# Patient Record
Sex: FEMALE | Race: WHITE | Employment: FULL TIME | URBAN - METROPOLITAN AREA
[De-identification: names, ages, dates, MRNs, and addresses within clinical notes are randomized per-mention and may not be internally consistent; named-entity substitution may affect disease eponyms.]

---

## 2018-07-12 ENCOUNTER — HOSPITAL ENCOUNTER (OUTPATIENT)
Dept: WOMENS IMAGING | Age: 48
Discharge: HOME | End: 2018-07-12
Attending: OBSTETRICS & GYNECOLOGY | Admitting: OBSTETRICS & GYNECOLOGY

## 2018-07-12 DIAGNOSIS — Z12.31 VISIT FOR SCREENING MAMMOGRAM: ICD-10-CM

## 2018-07-17 ENCOUNTER — HOSPITAL ENCOUNTER (OUTPATIENT)
Dept: CT IMAGING | Age: 48
Discharge: HOME | End: 2018-07-17
Admitting: SPECIALIST

## 2018-07-17 DIAGNOSIS — C64.2 RENAL CELL CARCINOMA OF LEFT KIDNEY (HCC): ICD-10-CM

## 2018-07-17 DIAGNOSIS — C64.9 MALIGNANT NEOPLASM OF KIDNEY EXCLUDING RENAL PELVIS (HCC): ICD-10-CM

## 2020-04-01 ENCOUNTER — HOSPITAL ENCOUNTER (OUTPATIENT)
Dept: GENERAL RADIOLOGY | Age: 50
Discharge: HOME | End: 2020-04-01
Payer: COMMERCIAL

## 2020-04-01 ENCOUNTER — HOSPITAL ENCOUNTER (OUTPATIENT)
Age: 50
Discharge: HOME | End: 2020-04-01
Payer: COMMERCIAL

## 2020-04-01 PROCEDURE — 71046 X-RAY EXAM CHEST 2 VIEWS: CPT

## 2022-06-21 DIAGNOSIS — H91.90 HEARING LOSS, UNSPECIFIED HEARING LOSS TYPE, UNSPECIFIED LATERALITY: Primary | ICD-10-CM

## 2022-06-22 RX ORDER — LORATADINE 10 MG/1
10 CAPSULE, LIQUID FILLED ORAL DAILY
COMMUNITY
End: 2022-07-06

## 2022-06-22 NOTE — PROGRESS NOTES
[facility] EGD PRE-OPERATIVE INSTRUCTIONS    Procedure date__06/28/22_______  Arrival time___0830_________          Surgery time_0930___________       Nothing by mouth after midnight the night before the procedure. This includes water chewing gum, mints and ice chips. You may brush your teeth and gargle the morning of your surgery, but do not swallow the water    You may be asked to stop blood thinners such as Coumadin, Plavix, Fragmin, Lovenox, etc., or any anti-inflammatories such as:  Aspirin, Ibuprofen, Advil, Naproxen prior to your procedure. We also ask that you stop any OTC medications such as fish oil, vitamin E, glucosamine, garlic, Multivitamins, COQ 10, etc.    You must make arrangements for a responsible adult to arrive with you and stay in our waiting area during your procedure. They will also need to take you home after your procedure. For your safety you will not be allowed to leave alone or drive yourself home. Also for your safety, it is strongly suggested that someone stay with you the first 24 hours after your procedure. For your comfort, please wear simple loose fitting clothing to the center. Please do not bring valuables. If you have a living will and a durable power of  for healthcare, please bring in a copy.     You will need to bring a photo ID and insurance card    Our goal is to provide you with excellent care so if you have any questions, please contact us at the [facility] at [phone]         Please note these are generalized instructions for all EGD cases, you may be provided with more specific instructions if necessary

## 2022-06-27 ENCOUNTER — ANESTHESIA EVENT (OUTPATIENT)
Dept: ENDOSCOPY | Age: 52
End: 2022-06-27
Payer: COMMERCIAL

## 2022-06-28 ENCOUNTER — HOSPITAL ENCOUNTER (OUTPATIENT)
Age: 52
Setting detail: OUTPATIENT SURGERY
Discharge: HOME | End: 2022-06-28
Attending: INTERNAL MEDICINE | Admitting: INTERNAL MEDICINE
Payer: COMMERCIAL

## 2022-06-28 ENCOUNTER — ANESTHESIA (OUTPATIENT)
Dept: ENDOSCOPY | Age: 52
End: 2022-06-28
Payer: COMMERCIAL

## 2022-06-28 VITALS
HEIGHT: 66 IN | HEART RATE: 78 BPM | TEMPERATURE: 97 F | DIASTOLIC BLOOD PRESSURE: 67 MMHG | BODY MASS INDEX: 18.48 KG/M2 | WEIGHT: 115 LBS | OXYGEN SATURATION: 97 % | SYSTOLIC BLOOD PRESSURE: 94 MMHG | RESPIRATION RATE: 18 BRPM

## 2022-06-28 PROCEDURE — 2580000003 HC RX 258: Performed by: NURSE ANESTHETIST, CERTIFIED REGISTERED

## 2022-06-28 PROCEDURE — 6360000002 HC RX W HCPCS: Performed by: NURSE ANESTHETIST, CERTIFIED REGISTERED

## 2022-06-28 PROCEDURE — 7100000011 HC PHASE II RECOVERY - ADDTL 15 MIN: Performed by: INTERNAL MEDICINE

## 2022-06-28 PROCEDURE — 3700000001 HC ADD 15 MINUTES (ANESTHESIA): Performed by: INTERNAL MEDICINE

## 2022-06-28 PROCEDURE — 2580000003 HC RX 258: Performed by: ANESTHESIOLOGY

## 2022-06-28 PROCEDURE — 3609017100 HC EGD: Performed by: INTERNAL MEDICINE

## 2022-06-28 PROCEDURE — 3700000000 HC ANESTHESIA ATTENDED CARE: Performed by: INTERNAL MEDICINE

## 2022-06-28 PROCEDURE — 2500000003 HC RX 250 WO HCPCS: Performed by: NURSE ANESTHETIST, CERTIFIED REGISTERED

## 2022-06-28 PROCEDURE — 7100000010 HC PHASE II RECOVERY - FIRST 15 MIN: Performed by: INTERNAL MEDICINE

## 2022-06-28 RX ORDER — SODIUM CHLORIDE 0.9 % (FLUSH) 0.9 %
5-40 SYRINGE (ML) INJECTION EVERY 12 HOURS SCHEDULED
Status: DISCONTINUED | OUTPATIENT
Start: 2022-06-28 | End: 2022-06-28 | Stop reason: HOSPADM

## 2022-06-28 RX ORDER — SODIUM CHLORIDE 0.9 % (FLUSH) 0.9 %
5-40 SYRINGE (ML) INJECTION PRN
Status: DISCONTINUED | OUTPATIENT
Start: 2022-06-28 | End: 2022-06-28 | Stop reason: HOSPADM

## 2022-06-28 RX ORDER — PROPOFOL 10 MG/ML
INJECTION, EMULSION INTRAVENOUS PRN
Status: DISCONTINUED | OUTPATIENT
Start: 2022-06-28 | End: 2022-06-28 | Stop reason: SDUPTHER

## 2022-06-28 RX ORDER — SODIUM CHLORIDE 9 MG/ML
INJECTION, SOLUTION INTRAVENOUS CONTINUOUS
Status: DISCONTINUED | OUTPATIENT
Start: 2022-06-28 | End: 2022-06-28 | Stop reason: HOSPADM

## 2022-06-28 RX ORDER — ONDANSETRON 2 MG/ML
INJECTION INTRAMUSCULAR; INTRAVENOUS
Status: DISCONTINUED
Start: 2022-06-28 | End: 2022-06-28 | Stop reason: HOSPADM

## 2022-06-28 RX ORDER — SODIUM CHLORIDE 9 MG/ML
INJECTION, SOLUTION INTRAVENOUS CONTINUOUS PRN
Status: DISCONTINUED | OUTPATIENT
Start: 2022-06-28 | End: 2022-06-28 | Stop reason: SDUPTHER

## 2022-06-28 RX ORDER — SODIUM CHLORIDE 9 MG/ML
INJECTION, SOLUTION INTRAVENOUS PRN
Status: DISCONTINUED | OUTPATIENT
Start: 2022-06-28 | End: 2022-06-28 | Stop reason: HOSPADM

## 2022-06-28 RX ORDER — LIDOCAINE HYDROCHLORIDE 20 MG/ML
INJECTION, SOLUTION EPIDURAL; INFILTRATION; INTRACAUDAL; PERINEURAL PRN
Status: DISCONTINUED | OUTPATIENT
Start: 2022-06-28 | End: 2022-06-28 | Stop reason: SDUPTHER

## 2022-06-28 RX ORDER — PROPOFOL 10 MG/ML
INJECTION, EMULSION INTRAVENOUS CONTINUOUS PRN
Status: DISCONTINUED | OUTPATIENT
Start: 2022-06-28 | End: 2022-06-28 | Stop reason: SDUPTHER

## 2022-06-28 RX ADMIN — SODIUM CHLORIDE: 9 INJECTION, SOLUTION INTRAVENOUS at 08:47

## 2022-06-28 RX ADMIN — LIDOCAINE HYDROCHLORIDE 60 MG: 20 INJECTION, SOLUTION EPIDURAL; INFILTRATION; INTRACAUDAL; PERINEURAL at 09:48

## 2022-06-28 RX ADMIN — PROPOFOL 70 MG: 10 INJECTION, EMULSION INTRAVENOUS at 09:48

## 2022-06-28 RX ADMIN — SODIUM CHLORIDE: 9 INJECTION, SOLUTION INTRAVENOUS at 09:02

## 2022-06-28 RX ADMIN — PROPOFOL 200 MCG/KG/MIN: 10 INJECTION, EMULSION INTRAVENOUS at 09:48

## 2022-06-28 ASSESSMENT — PAIN DESCRIPTION - DESCRIPTORS: DESCRIPTORS: ACHING

## 2022-06-28 ASSESSMENT — ENCOUNTER SYMPTOMS: SHORTNESS OF BREATH: 0

## 2022-06-28 ASSESSMENT — PAIN - FUNCTIONAL ASSESSMENT
PAIN_FUNCTIONAL_ASSESSMENT: 0-10
PAIN_FUNCTIONAL_ASSESSMENT: PREVENTS OR INTERFERES SOME ACTIVE ACTIVITIES AND ADLS
PAIN_FUNCTIONAL_ASSESSMENT: NONE - DENIES PAIN

## 2022-06-28 ASSESSMENT — PAIN SCALES - GENERAL
PAINLEVEL_OUTOF10: 0

## 2022-06-28 NOTE — ANESTHESIA POSTPROCEDURE EVALUATION
Department of Anesthesiology  Postprocedure Note    Patient: Chivo Thompson  MRN: [id number]  YOB: 1970  Date of evaluation: 6/28/2022      Procedure Summary     Date: 06/28/22 Room / Location: [address] [place] [address]    Anesthesia Start: 0940 Anesthesia Stop: 1355    Procedure: EGD ESOPHAGOGASTRODUODENOSCOPY (N/A ) Diagnosis:       Nausea and vomiting, intractability of vomiting not specified, unspecified vomiting type      Epigastric pain      (Nausea and vomiting, Epigastric pain)    Surgeons: Ryne De Jesus MD Responsible Provider: Jeniffer Stephens MD    Anesthesia Type: MAC ASA Status: 2          Anesthesia Type: No value filed.     Elba Phase I: Elba Score: 10    Elba Phase II: Elba Score: 10      Anesthesia Post Evaluation    Patient location during evaluation: PACU  Patient participation: complete - patient participated  Level of consciousness: awake  Airway patency: patent  Nausea & Vomiting: no nausea and no vomiting  Cardiovascular status: blood pressure returned to baseline  Respiratory status: acceptable  Hydration status: stable  Multimodal analgesia pain management approach

## 2022-06-28 NOTE — OP NOTE
Operative Note      Patient: Mickie Miller  YOB: 1970  MRN: [id number]    Date of Procedure: 6/28/2022    Pre-Op Diagnosis: Nausea and vomiting, Epigastric pain    Post-Op Diagnosis: Same       Procedure(s):  EGD ESOPHAGOGASTRODUODENOSCOPY    Surgeon(s):  Margaret Awan MD    Assistant:   * No surgical staff found *    Anesthesia: Monitor Anesthesia Care    Estimated Blood Loss (mL): None    Complications: None    Specimens:   * No specimens in log *    Implants:  * No implants in log *      Drains: * No LDAs found *    Findings: See dictated report    Detailed Description of Procedure:   See dictated report    Electronically signed by Maragret Awan MD on 6/28/2022 at 9:59 AM

## 2022-06-28 NOTE — ANESTHESIA PRE PROCEDURE
Department of Anesthesiology  Preprocedure Note       Name:  Tati Belle   Age:  50 y.o.  :  1970                                          MRN:  [id number]         Date:  2022      Surgeon: Brayan Berger):  Nadia Do MD    Procedure: Procedure(s):  EGD ESOPHAGOGASTRODUODENOSCOPY    Medications prior to admission:   Prior to Admission medications    Medication Sig Start Date End Date Taking? Authorizing Provider   loratadine (CLARITIN) 10 MG capsule Take 10 mg by mouth daily   Yes Historical Provider, MD   omeprazole (PRILOSEC) 20 MG delayed release capsule Take 40 mg by mouth daily    Historical Provider, MD   diazepam (VALIUM) 5 MG tablet Take 5 mg by mouth every 12 hours as needed for Anxiety    Historical Provider, MD       Current medications:    Current Facility-Administered Medications   Medication Dose Route Frequency Provider Last Rate Last Admin    0.9 % sodium chloride infusion   IntraVENous Continuous Casie Augustin MD 75 mL/hr at 22 0902 New Bag at 22 0902    sodium chloride flush 0.9 % injection 5-40 mL  5-40 mL IntraVENous 2 times per day aCsie Augustin MD        sodium chloride flush 0.9 % injection 5-40 mL  5-40 mL IntraVENous PRN Casie Augustin MD        0.9 % sodium chloride infusion   IntraVENous PRN Casie Augustin MD        ondansetron [facility]) 4 MG/2ML injection              Facility-Administered Medications Ordered in Other Encounters   Medication Dose Route Frequency Provider Last Rate Last Admin    0.9 % sodium chloride infusion   IntraVENous Continuous PRN DAVE Jeff - CRNA   New Bag at 22 6790       Allergies: Allergies   Allergen Reactions    Sulfa Antibiotics Rash       Problem List:  There is no problem list on file for this patient.       Past Medical History:        Diagnosis Date    Anxiety     GERD (gastroesophageal reflux disease)     Kidney stones        Past Surgical History:        Procedure Laterality Date     SECTION      ENDOSCOPY, COLON, DIAGNOSTIC      TUBAL LIGATION         Social History:    Social History     Tobacco Use    Smoking status: Current Every Day Smoker     Packs/day: 1.00     Types: Cigarettes    Smokeless tobacco: Never Used   Substance Use Topics    Alcohol use: Yes     Comment: rarely                                Ready to quit: Not Answered  Counseling given: Not Answered      Vital Signs (Current):   Vitals:    22 1210 22 0850 22 0858   BP:   95/61   Pulse:   88   Resp:   17   Temp:   97.6 °F (36.4 °C)   TempSrc:   Temporal   SpO2:   99%   Weight: 115 lb (52.2 kg) 115 lb (52.2 kg)    Height: 5' 6\" (1.676 m) 5' 6\" (1.676 m)                                               BP Readings from Last 3 Encounters:   22 95/61   18 123/86   17 120/81       NPO Status: Time of last liquid consumption:                         Time of last solid consumption: 0                        Date of last liquid consumption: 22                        Date of last solid food consumption: 22    BMI:   Wt Readings from Last 3 Encounters:   22 115 lb (52.2 kg)   18 127 lb 10.3 oz (57.9 kg)   17 114 lb 10.2 oz (52 kg)     Body mass index is 18.56 kg/m².     CBC:   Lab Results   Component Value Date    WBC 7.5 2018    RBC 3.43 2018    HGB 11.5 2018    HCT 32.3 2018    MCV 94.1 2018    RDW 13.0 2018     2018       CMP:   Lab Results   Component Value Date     2018    K 3.1 2018     2018    CO2 23 2018    BUN 6 2018    CREATININE <0.5 2018    GFRAA >60 2018    GFRAA >60 10/20/2010    AGRATIO 1.3 2018    LABGLOM >60 2018    GLUCOSE 87 2018    PROT 6.9 2018    PROT 8.1 10/20/2010    CALCIUM 8.5 2018    BILITOT 0.5 2018    ALKPHOS 59 2018    AST 13 2018    ALT 7 2018 POC Tests: No results for input(s): POCGLU, POCNA, POCK, POCCL, POCBUN, POCHEMO, POCHCT in the last 72 hours. Coags:   Lab Results   Component Value Date    PROTIME 11.7 10/20/2010    INR 1.06 10/20/2010    APTT 30.2 10/20/2010       HCG (If Applicable):   Lab Results   Component Value Date    PREGTESTUR Negative 06/18/2018        ABGs: No results found for: PHART, PO2ART, FSW9SGZ, CAF2GVH, BEART, O9AYZEVM     Type & Screen (If Applicable):  No results found for: LABABO, LABRH    Drug/Infectious Status (If Applicable):  No results found for: HIV, HEPCAB    COVID-19 Screening (If Applicable): No results found for: COVID19        Anesthesia Evaluation  Patient summary reviewed and Nursing notes reviewed no history of anesthetic complications:   Airway: Mallampati: II  TM distance: >3 FB   Neck ROM: full  Mouth opening: > = 3 FB   Dental:    (+) edentulous, upper dentures and lower dentures      Pulmonary: breath sounds clear to auscultation      (-) pneumonia, asthma, shortness of breath, recent URI and sleep apnea                           Cardiovascular:  Exercise tolerance: good (>4 METS),       (-) hypertension, valvular problems/murmurs, past MI, CAD, CABG/stent, dysrhythmias,  angina,  RAY and no pulmonary hypertension      Rhythm: regular  Rate: normal                    Neuro/Psych:   (+) depression/anxiety    (-) seizures, TIA and CVA           GI/Hepatic/Renal:   (+) GERD:, renal disease (hx of kidney stones): kidney stones,      (-) liver disease       Endo/Other:        (-) diabetes mellitus, hypothyroidism               Abdominal:             Vascular:     - PVD, DVT and PE. Other Findings:           Anesthesia Plan      MAC     ASA 2     (She is having issues with diarrhea and nausea and vomiting. Currently not feeling nauseated or having emesis. Given zofran in pre-op for prophylactic treatment.     Discussed risks and benefits to sedation including nausea, vomiting, allergic reaction, headache, delayed cognitive recovery, stroke, heart attack, respiratory depression, and death which patient understood and agreed to proceed. The patient was given the opportunity to ask questions and all questions were answered to the patient's satisfaction.  )  Induction: intravenous. Anesthetic plan and risks discussed with patient. Plan discussed with CRNA. This pre-anesthesia assessment may be used as a history and physical.    DOS STAFF ADDENDUM:    Pt seen and examined, chart reviewed (including anesthesia, drug and allergy history). No interval changes to history and physical examination. Anesthetic plan, risks, benefits, alternatives, and personnel involved discussed with patient. Patient verbalized an understanding and agrees to proceed.       Oleksandr Martinez MD  June 28, 2022  9:20 AM

## 2022-06-28 NOTE — PROCEDURES
[address] [place] [address]                                 PROCEDURE NOTE    PATIENT NAME: Darinel Byrne                    :        1970  MED REC NO:   [id number]                          ROOM:  ACCOUNT NO:   [de-identified]                           ADMIT DATE: 2022  PROVIDER:     Aaron Bennett MD    EGD    DATE OF PROCEDURE:  2022    REFERRING PROVIDER:  Katy Vergara MD    PATIENT HISTORY:  A 46-year-old female, outpatient. INSTRUMENT USED:  Olympus GIF-Q180. ANESTHESIA:  The patient was premedicated with Diprivan intravenously as  administered by the anesthesiology service. INDICATIONS:  The patient has presented with epigastric pain, nausea and  vomiting. There is a previous history of peptic ulcer disease. Up  until recently, the patient was taking ibuprofen and she continues to  smoke cigarettes. PROCEDURE:  The endoscope was inserted into the esophagus without  difficulty. The esophageal mucosa was entirely normal, revealing no  evidence of inflammatory or metaplastic change. The Z-line was located  at 35 cm, above a 3-cm hiatal hernia. The stomach, duodenal bulb and  descending duodenum were all normal.    ESTIMATED BLOOD LOSS:  None. IMPRESSION:  A 3-cm hiatal hernia only. PLAN:  The patient will follow up in the office. I had a discussion  with the patient's  about her symptoms. She has been under  tremendous stress recently. She may suffer from nonulcer dyspepsia. CAR Loera MD    D: 2022 10:17:25       T: 2022 10:21:58     [id number]  Job#: [id number]     Doc#: [id number]    CC:  Aaron Flynn MD

## 2022-06-28 NOTE — H&P
[facility]   Pre-operative History and Physical    Patient: Sarah Cm  : 1970  Acct#:         HISTORY OF PRESENT ILLNESS:    The patient is a 50 y.o. female  who presents with epigastric pain, nausea, vomiting  Past Medical History:        Diagnosis Date    Anxiety     GERD (gastroesophageal reflux disease)     Kidney stones      Past Surgical History:        Procedure Laterality Date     SECTION      ENDOSCOPY, COLON, DIAGNOSTIC      TUBAL LIGATION       Medications prior to admission:   Prior to Admission medications    Medication Sig Start Date End Date Taking?  Authorizing Provider   loratadine (CLARITIN) 10 MG capsule Take 10 mg by mouth daily   Yes Historical Provider, MD   omeprazole (PRILOSEC) 20 MG delayed release capsule Take 40 mg by mouth daily    Historical Provider, MD   diazepam (VALIUM) 5 MG tablet Take 5 mg by mouth every 12 hours as needed for Anxiety    Historical Provider, MD     Allergies:    Sulfa antibiotics  Social History:   Social History     Socioeconomic History    Marital status:      Spouse name: Not on file    Number of children: Not on file    Years of education: Not on file    Highest education level: Not on file   Occupational History    Not on file   Tobacco Use    Smoking status: Current Every Day Smoker     Packs/day: 1.00     Types: Cigarettes    Smokeless tobacco: Never Used   Substance and Sexual Activity    Alcohol use: Yes     Comment: rarely    Drug use: Yes     Types: Marijuana (Weed)     Comment: using to see if appetite increases    Sexual activity: Not on file   Other Topics Concern    Not on file   Social History Narrative    Not on file     Social Determinants of Health     Financial Resource Strain:     Difficulty of Paying Living Expenses: Not on file   Food Insecurity:     Worried About Running Out of Food in the Last Year: Not on file    Carrie of Food in the Last Year: Not on file   Transportation Needs:     Lack of Transportation (Medical): Not on file    Lack of Transportation (Non-Medical): Not on file   Physical Activity:     Days of Exercise per Week: Not on file    Minutes of Exercise per Session: Not on file   Stress:     Feeling of Stress : Not on file   Social Connections:     Frequency of Communication with Friends and Family: Not on file    Frequency of Social Gatherings with Friends and Family: Not on file    Attends [employer] Services: Not on file    Active Member of [address] [place] [address] or Organizations: Not on file    Attends Club or Organization Meetings: Not on file    Marital Status: Not on file   Intimate Partner Violence:     Fear of Current or Ex-Partner: Not on file    Emotionally Abused: Not on file    Physically Abused: Not on file    Sexually Abused: Not on file   Housing Stability:     Unable to Pay for Housing in the Last Year: Not on file    Number of [place] in the Last Year: Not on file    Unstable Housing in the Last Year: Not on file           Family History:   History reviewed. No pertinent family history. PHYSICAL EXAM:      BP 95/61   Pulse 88   Temp 97.6 °F (36.4 °C) (Temporal)   Resp 17   Ht 5' 6\" (1.676 m)   Wt 115 lb (52.2 kg)   LMP 06/06/2018   SpO2 99%   BMI 18.56 kg/m²  I        Heart: Normal    Lungs: Normal    Abdomen: Normal      ASA Grade: ASA 2 - Patient with mild systemic disease with no functional limitations    II (soft palate, uvula, fauces visible)  ASSESSMENT AND PLAN:    1. Patient is a 50 y.o. female here for EGD  2. Procedure options, risks and benefits reviewed with patient who expresses understanding.

## 2022-06-29 ENCOUNTER — HOSPITAL ENCOUNTER (OUTPATIENT)
Dept: ULTRASOUND IMAGING | Age: 52
Discharge: HOME | End: 2022-06-29
Payer: COMMERCIAL

## 2022-06-29 DIAGNOSIS — R63.4 ABNORMAL WEIGHT LOSS: ICD-10-CM

## 2022-06-29 DIAGNOSIS — R10.9 ABDOMINAL PAIN, UNSPECIFIED ABDOMINAL LOCATION: ICD-10-CM

## 2022-06-29 PROCEDURE — 76705 ECHO EXAM OF ABDOMEN: CPT

## 2022-07-05 NOTE — PROGRESS NOTES
Clarine Osgood   1970, 50 y.o. female   [id number]       Referring Provider: Monik Chen MD  Referral Type: In an order in [address] [place] [address]    Reason for Visit: Evaluation of suspected change in hearing, tinnitus, or balance. ADULT AUDIOLOGIC EVALUATION      Clarine Osgood is a 50 y.o. female seen today, 7/6/2022 , for an initial audiologic evaluation. Patient was seen by Monik Chen MD following today's evaluation. AUDIOLOGIC AND OTHER PERTINENT MEDICAL HISTORY:      Clarine Osgood noted tinnitus, dizziness, imbalance, history of falls, history of ear surgery and family history of hearing loss. Patient reports episodic dizziness described as true vertigo that started 3-4 months ago, lasts seconds to minutes, and often occurs with change of position from laying to sitting, and tilting head back. Patient notes 2 falls due to dizziness since May. Of note patient reports constant headaches. She also notes a history of multiple left ear infections as well as PE tubes in the left ear. She notes her father and paternal grandfather have a history of hearing loss. Patient reports bilateral tinnitus that is intermittent in nature. Clarine Osgood denied otalgia, aural fullness, otorrhea, history of significant noise exposure and history of head trauma. Date: 7/6/2022     IMPRESSIONS:        AD:Hearing WNL sloping to Moderate SNHL, Excellent WRS, Type A tymp  Reflexes: Present IPSI and Absent/ Present CONTRA  AS:Hearing WNL sloping to ModerateSNHL (Note: 20-30 dB Asymmetry at 2-4 kHz) , Excellent WRS, Type Ad tymp  Reflexes: Present IPSI and Absent/ Elevated CONTRA    Test results consistent with asymmetric Sensorineural hearing loss. Hearing loss significant enough to create hearing difficulty in some listening situations. Discussed hearing loss, tinnitus, hearing aids and dizziness with patient.  Patient to follow medical recommendations per Monik Chen MD .    ASSESSMENT AND FINDINGS:     Otoscopy revealed: Clear ear canals bilaterally    RIGHT EAR:  Hearing Sensitivity: Normal hearing sensitivity to Moderate   Sensorineural hearing loss  Speech Recognition Threshold: 20 dB HL  Word Recognition:Excellent (92%), based on NU-6 25-word list at 55m dBHL using recorded speech stimuli. Tympanometry: Normal peak pressure and compliance, Type A tympanogram, consistent with normal middle ear function. Acoustic Reflexes: Ipsilateral: Present at normal sensation levels. Contralateral: Present at normal sensation levels and Absent. LEFT EAR:  Hearing Sensitivity: Normal hearing sensitivity to Moderate   Sensorineural hearing loss  (Note: 20-30 dB Asymmetry at 2-4 kHz)  Speech Recognition Threshold: 25 dB HL  Word Recognition:Excellent (92%), based on NU-6 25-word list at 70m dBHL using recorded speech stimuli. Tympanometry: Normal peak pressure with high compliance, Type Ad tympanogram, consistent with hypermobile tympanic membrane. Acoustic Reflexes: Ipsilateral: Present at normal sensation levels. Contralateral: Present at elevated sensation levels and Absent. Reliability: Good  Transducer: HF Headphones    See scanned audiogram dated 7/6/2022  for results. PATIENT EDUCATION:     The following items were discussed with the patient:   - Good Communication Strategies  - Hearing Loss and Hearing Aids  - Tinnitus Management Strategies    - Fall Risk and Prevention   - Dizziness    Educational information was shared in the After Visit Summary.                                                 RECOMMENDATIONS:                                                                                                                                                                                                                                                          The following items are recommended based on patient report and results from today's appointment:   - Continue medical follow-up with Kassy Delong MD.   - Retest hearing as medically indicated and/or sooner if a change in hearing is noted. - If desired, schedule a Hearing Aid Evaluation (HAE) appointment to discuss hearing aid options. - Utilize \"Good Communication Strategies\" as discussed to assist in speech understanding with communication partners. - Maintain a sound enriched environment to assist in the management of tinnitus symptoms.  - Use hearing protection devices (HPDs), such as protective ear muffs and ear plugs, when exposed to dangerous sound levels. - If medically indicated, consider vestibular evaluation to further investigate symptoms of dizziness.        [facility], [facility]  Audiologist    Chart CC'd to: Samantha Cruz MD      Degree of   Hearing Sensitivity dB Range   Within Normal Limits (WNL) 0 - 20   Mild 20 - 40   Moderate 40 - 55   Moderately-Severe 55 - 70   Severe 70 - 90   Profound 90 +

## 2022-07-06 ENCOUNTER — OFFICE VISIT (OUTPATIENT)
Dept: ENT CLINIC | Age: 52
End: 2022-07-06
Payer: COMMERCIAL

## 2022-07-06 ENCOUNTER — PROCEDURE VISIT (OUTPATIENT)
Dept: AUDIOLOGY | Age: 52
End: 2022-07-06
Payer: COMMERCIAL

## 2022-07-06 VITALS
HEIGHT: 66 IN | BODY MASS INDEX: 18.32 KG/M2 | SYSTOLIC BLOOD PRESSURE: 103 MMHG | HEART RATE: 106 BPM | RESPIRATION RATE: 16 BRPM | DIASTOLIC BLOOD PRESSURE: 70 MMHG | WEIGHT: 114 LBS

## 2022-07-06 DIAGNOSIS — J34.2 DEVIATED NASAL SEPTUM: ICD-10-CM

## 2022-07-06 DIAGNOSIS — H91.8X9 ASYMMETRICAL HEARING LOSS: Primary | ICD-10-CM

## 2022-07-06 DIAGNOSIS — J31.0 CHRONIC RHINITIS: ICD-10-CM

## 2022-07-06 DIAGNOSIS — R42 VERTIGO: ICD-10-CM

## 2022-07-06 DIAGNOSIS — J39.2 LESION OF NASOPHARYNX: ICD-10-CM

## 2022-07-06 DIAGNOSIS — R09.81 NASAL CONGESTION: ICD-10-CM

## 2022-07-06 DIAGNOSIS — H93.13 TINNITUS OF BOTH EARS: ICD-10-CM

## 2022-07-06 DIAGNOSIS — H90.3 SENSORINEURAL HEARING LOSS (SNHL) OF BOTH EARS: Primary | ICD-10-CM

## 2022-07-06 DIAGNOSIS — R42 DIZZINESS: ICD-10-CM

## 2022-07-06 DIAGNOSIS — J34.89 NASAL OBSTRUCTION: ICD-10-CM

## 2022-07-06 DIAGNOSIS — R51.9 FACIAL PAIN: ICD-10-CM

## 2022-07-06 DIAGNOSIS — R04.0 EPISTAXIS: ICD-10-CM

## 2022-07-06 DIAGNOSIS — K14.8 TONGUE LESION: ICD-10-CM

## 2022-07-06 PROCEDURE — 92550 TYMPANOMETRY & REFLEX THRESH: CPT | Performed by: AUDIOLOGIST

## 2022-07-06 PROCEDURE — 99204 OFFICE O/P NEW MOD 45 MIN: CPT | Performed by: STUDENT IN AN ORGANIZED HEALTH CARE EDUCATION/TRAINING PROGRAM

## 2022-07-06 PROCEDURE — 92557 COMPREHENSIVE HEARING TEST: CPT | Performed by: AUDIOLOGIST

## 2022-07-06 PROCEDURE — 31231 NASAL ENDOSCOPY DX: CPT | Performed by: STUDENT IN AN ORGANIZED HEALTH CARE EDUCATION/TRAINING PROGRAM

## 2022-07-06 RX ORDER — LORATADINE 10 MG/1
10 TABLET ORAL DAILY
COMMUNITY

## 2022-07-06 RX ORDER — OMEPRAZOLE 40 MG/1
CAPSULE, DELAYED RELEASE ORAL
COMMUNITY
Start: 2022-06-21

## 2022-07-06 NOTE — PROGRESS NOTES
[address] (:  1970) is a 50 y.o. female, here for evaluation of the following chief complaint(s):  Dizziness, Otitis Media (left ), and Sinus Problem      ASSESSMENT/PLAN:  1. Asymmetrical hearing loss  -     MRI IAC POSTERIOR FOSSA W WO CONTRAST; Future  2. Vertigo  -     MRI IAC POSTERIOR FOSSA W WO CONTRAST; Future  3. Nasal congestion  4. Nasal obstruction  5. Chronic rhinitis  6. Facial pain  7. Epistaxis      This is a very pleasant 50 y.o. female here today for evaluation of the the above-noted complaints. We will reevaluate her left anterior tongue lesion and nasopharyngeal lesion. If there been any changes we will consider biopsy. The patient has evidence of significant sinonasal inflammation on exam today. I have asked the patient to begin twice daily sinus irrigations and will prescribe the patient fluticasone, to be used 2 sprays twice a day in each nostril. Depending on how the patient responds to this therapy, we can discuss further medical and/or surgical options, and if imaging would be appropriate. I independently reviewed her audiogram.  It shows evidence of right-sided hearing within normal limits sloping to moderate sensorineural hearing loss with excellent word recognition scores and type a tympanogram.  On the left there is evidence of hearing within normal sloping to moderate sensorineural hearing loss with 20 to 30 dB asymmetry 2 to 4 kHz. There is evidence of a type ad tympanogram.    Based on the patient's symptoms of dizziness/disequilibrium and asymmetric sensorineural hearing loss, we discussed that recommendation would be for an MRI IAC to rule out retrocochlear pathology. Depending on what the MRI shows, we can discuss whether or not she would be a good candidate for amplification.     We will consider a referral to vestibular therapy if her symptoms persist.      Medical Decision Making: The following items were considered in medical decision making:  Independent review of images  Review / order clinical lab tests  Review / order radiology tests  Decision to obtain old records  Review and summation of old records as accessed through [facility] if applicable    SUBJECTIVE/OBJECTIVE:  [facility]    Ben Kincaid is here today for evaluation of issues related to her nose and ear. Patient states that she has 3 to 4 months of episodic dizziness. She describes these as true vertigo. They will usually last for brief periods of time from a few seconds to a few minutes. They are exacerbated with leftward head turn when she is lying in the bed. She can also get recurrence of her symptoms when she goes from a seated to standing position. She has had 2 falls since this occurred. She also notes frequent headaches. In the past, she has had issues related to multiple ear infections on the left side as well as an ear tube in the left ear. Patient denies otorrhea, syncope, fluctuating hearing, aural fullness. Patient also has issues related to multiple sinonasal complaints. Patient states that she gets intermittent facial pain and pressure which she describes as located in between her eyes and central aspect of her forehead. She gets nasal obstruction which alternates from side to side but is persistent. She gets purulent nasal drainage every couple weeks. She recently trialed fluticasone. She is unclear if this is helped her symptoms. Her sense of smell is diminished. She also has a history of epistaxis on the left side. Patient notes that she has a history of heavy metal exposure through her work and [facility], particularly nickel.           REVIEW OF SYSTEMS  The following systems were reviewed and revealed the following in addition to any already discussed in the [facility]:    PHYSICAL EXAM    GENERAL: No acute distress, alert and oriented, no hoarseness, strong voice  EYES: EOMI, Anti-icteric  HENT:   Head: Normocephalic and atraumatic. Face:  Symmetric, facial nerve intact  Right Ear: Normal external ear, normal external auditory canal, intact tympanic membrane with normal mobility and aerated middle ear  Left Ear: Normal external ear, normal external auditory canal, intact tympanic membrane with normal mobility and aerated middle ear  Mouth/Oral Cavity:  normal lips, Uvula is midline, no mucosal lesions, no trismus, without dentition, normal salivary quality/flow  Oropharynx/Larynx:  normal oropharynx, unremarkable tonsils  Nose:Normal external nasal appearance. Anterior rhinoscopy shows  a deviated septum preventing view posteriorly. Swelling of turbinates. Normal mucosa   NECK: Normal range of motion, no thyromegaly, trachea is midline, no lymphadenopathy, no neck masses, no crepitus  CHEST: Normal respiratory effort, no retractions, breathing comfortably  SKIN: No rashes, normal appearing skin, no evidence of skin lesions/tumors  Neuro:  cranial nerve II-XII intact; normal gait  Cardio:  no edema    2 mm flat white lesion of the anterolateral left tongue. Ducor-Hallpike negative bilaterally    PROCEDURE  Nasal Endoscopy (CPT code 62898)       Due to the patients chronic sinus disease and/or history of sinonasal neoplasm for surveillance a nasal endoscopy with or without debridement will be performed to complete a significant physical examination of the patient which cannot be performed by anterior rhinoscopy alone (failure of complete examination of the paranasal sinuses). Failure to provide this procedure may lead to late detection of significant chronic benign disease, acute exacerbation, resolution or failure of early diagnosis of recurrent cancer. The procedure report is present in the body of the chart. Verbal consent was received.  After topical anesthesia and decongestion had been obtained using aerosolized 1% lidocaine and oxymetazoline, a 45 degree rigid endoscope was placed into both nares with the patient in a sitting position. The following was observed:        Septum: intact and deviated   Other:   -The inferior and middle turbinates were examined. The middle meatus, and sphenoethmoid recess was examined bilaterally.    -There is evidence of inferior turbinate hypertrophy and clear secretions. There is erythematous mucosa. There is some fullness of the nasopharynx which appears to be persistent adenoid tissue. No evidence of ulceration or bleeding. .   -There were no complications. Tolerated well without complication. I attest that I was present for and did the entire procedure myself. This note was generated completely or in part utilizing Dragon dictation speech recognition software. Occasionally, words are mistranscribed and despite editing, the text may contain inaccuracies due to incorrect word recognition. If further clarification is needed please contact the office at [phone]. An electronic signature was used to authenticate this note.     --Manpreet Rondon MD

## 2022-07-06 NOTE — PATIENT INSTRUCTIONS
If you are interested in pursuing hearing aids, here are the next steps:    1) Contact your insurance and ask, Do I have a benefit for hearing aids?    If the answer is no hearing aids will be a 100% out of pocket expense   If the answer is yes, ask Can I use this benefit at [facility] ([facility])?  or Where can I use this benefit?  If Mike Ferraro is not in-network for hearing aids, your insurance should be able to provide the appropriate contact information for an in-network provider. 2) Call [facility] ([phone]) to schedule a Hearing Aid Evaluation    This appointment is when we will discuss hearing aid options and decide which device is most appropriate for you. Learning About Hearing Aids  What is a hearing aid? A hearing aid makes sounds louder. It can help some people with hearing problems to hear better. Hearing aids do not restore normal hearing. But they can make it easier to communicate by making sounds clearer. · Digital programmable hearing aids can adjust themselves to work best where you are at any time. You also have more choices in setting them up than with analog hearing aids. There are also different styles of hearing aids. · A behind-the-ear (BTE) hearing aid connects to a plastic ear mold that fits inside the outer ear. BTE hearing aids are used for all levels of hearing loss, especially very severe hearing loss. They may be better for children for safety and growth reasons. Poorly fitting BTE ear molds or a buildup of earwax may cause a whistling sound (feedback). · An in-the-ear (ITE) hearing aid fits in the outer part of the ear. It can be used by people with mild to severe hearing loss. ITE hearing aids can be used with other hearing devices, such as a telecoil that improves hearing during phone calls. ITE hearing aids can be damaged by earwax and fluid draining from the ear. Their small size may be hard for some people to handle.  They are not often used in children because the case must be replaced as the child grows. · An in-the-canal (ITC) hearing aid fits into the ear canal. ITC hearing aids are used by people with mild to moderate hearing loss. They are made to fit the shape and the size of your ear canal. They can be damaged by earwax and fluid draining from the ear. Their small size may be hard for some people to handle. They are not made for children. You have some options if you have a hearing problem and are thinking about getting hearing aids. You can go to your doctor or an audiologist. He or she will do a hearing test and help you decide which type and style of hearing aid may be best for you. What else should I know about hearing aids? Find out if your insurance covers hearing aids. They can be expensive. Different types of hearing aids come with different costs. Also find out about a warranty or return policy in case you are not happy with your hearing aids. Follow-up care is a key part of your treatment and safety. Be sure to make and go to all appointments, and call your doctor if you are having problems. It's also a good idea to know your test results and keep a list of the medicines you take. Where can you learn more? Go to https://TruckTrack.[email or web address]. org and sign in to your [employer] account. Enter G698 in the [facility] box to learn more about \"Learning About Hearing Aids. \"     If you do not have an account, please click on the \"Sign Up Now\" link. Current as of: October 21, 2018  Content Version: 12.1  © 6278-2532 Healthwise, Incorporated. Care instructions adapted under license by [facility] ([facility]). If you have questions about a medical condition or this instruction, always ask your healthcare professional. [place] [address] any warranty or liability for your use of this information.       Hearing Loss: Care Instructions  Your Care Instructions      Hearing loss is a sudden or slow decrease in how well you hear. It can range from mild to profound. Permanent hearing loss can occur with aging, and it can happen when you are exposed long-term to loud noise. Examples include listening to loud music, riding motorcycles, or being around other loud machines. Hearing loss can affect your work and home life. It can make you feel lonely or depressed. You may feel that you have lost your independence. But hearing aids and other devices can help you hear better and feel connected to others. Follow-up care is a key part of your treatment and safety. Be sure to make and go to all appointments, and call your doctor if you are having problems. It's also a good idea to know your test results and keep a list of the medicines you take. How can you care for yourself at home? · Avoid loud noises whenever possible. This helps keep your hearing from getting worse. Always wear hearing protection around loud noises. · If appropriate, wear hearing aid(s) as directed. It is recommended that hearing aids are worn during all waking hours to keep your brain active and give it access to the sounds it is missing. · If you are beginning your process with hearing aid(s), schedule a \"Hearing Aid Evaluation\" with an audiologist to discuss your lifestyle, features of hearing aid technology, and styles of hearing aids available. It is recommended that you contact your insurance company to determine if you have a hearing aid benefit, as this may dictate who you can see for these services. · Have hearing tests as your doctor suggests. They can show whether your hearing has changed. Your hearing aid may need to be adjusted. · Use other assistive devices as needed. These may include:  ? Telephone amplifiers and hearing aids that can connect to a television, stereo, radio, or microphone. ? Devices that use lights or vibrations. These alert you to the doorbell, a ringing telephone, or a baby monitor. ? Television closed-captioning. This shows the words at the bottom of the screen. Most new TVs can do this. ? TTY (text telephone). This lets you type messages back and forth on the telephone instead of talking or listening. These devices are also called TDD. When messages are typed on the keyboard, they are sent over the phone line to a receiving TTY. The message is shown on a monitor. · Use pagers, fax machines, text, and email if it is hard for you to communicate by telephone. · Try to learn a listening technique called speech-reading. It is not lip-reading. You pay attention to people's gestures, expressions, posture, and tone of voice. These clues can help you understand what a person is saying. Face the person you are talking to, and have him or her face you. Make sure the lighting is good. You need to see the other person's face clearly. · Think about counseling if you need help to adjust to your hearing loss. When should you call for help? Watch closely for changes in your health, and be sure to contact your doctor if:    · You think your hearing is getting worse. · You have new symptoms, such as dizziness or nausea. Tinnitus: Overview and Management Strategies          Many people have some ringing sounds in their ears once in a while. You may hear a roar, a hiss, a tinkle, or a buzz. The sound usually lasts only a few minutes. If it goes on all the time, you may have tinnitus. Tinnitus is usually caused by long-term exposure to loud noise. This damages the nerves in the inner ear. It can occur with all types of hearing loss. It may be a symptom of almost any ear problem. Tinnitus may be caused by a buildup of earwax. Or, it may be caused by ear infections or certain medicines (especially antibiotics or large amounts of aspirin). You can also hear noises in your ears because of an injury to the ears, drinking too much alcohol or caffeine, or a medical condition.   Other conditions may also contribute to tinnitus, including: head and neck trauma, temporomandibular joint disorder (TMJ), sinus pressure and barometric trauma, traumatic brain injury, metabolic disorders, autoimmune disorders, stress, and high blood pressure. You may need tests to evaluate your hearing and to find causes of long-lasting tinnitus. Your doctor may suggest one or more treatments to help you cope with the tinnitus. You can also do things at home to help reduce symptoms. Causes of Tinnitus  We do not know the exact cause of tinnitus. One thing we do know is that you are not imagining it. If you have tinnitus, chances are the cause will remain a mystery. Conditions that might cause tinnitus include the following:    Hearing loss    Ménière's disease    Loud noise exposure    Migraines    Head injury    Drugs or medicines that are toxic to hearing    Anemia    High blood pressure    Stress    A lot of wax in the ear    Certain types of tumors    Having a lot of caffeine    Smoking cigarettes  You may find that your tinnitus is worse at night. This happens because it is quiet and you are not distracted. Feeling tired and stressed may make your tinnitus worse. Follow-up care is a key part of your treatment and safety. Be sure to make and go to all appointments, and call your doctor if you are having problems. It's also a good idea to know your test results and keep a list of the medicines you take. How can you care for yourself at home? · Limit or cut out alcohol, caffeine, and sodium. They can make your symptoms worse. · Do not smoke or use other tobacco products. Nicotine reduces blood flow to the ear and makes tinnitus worse. If you need help quitting, talk to your doctor about stop-smoking programs and medicines. These can increase your chances of quitting for good. · Talk to your doctor about whether to stop taking aspirin and similar products such as ibuprofen or naproxen. · Get exercise often.  It can help improve blood flow to the ear. Hearing Aids and Other Devices  A hearing aid may help your tinnitus if you have a hearing loss. An audiologist can help you find and use the best hearing aid for you. Tinnitus maskers look like hearing aids. They make a sound that masks, or covers up, the tinnitus. The masking sound distracts you from the ringing in your ears. You may be able to use a masker and a hearing aid at the same time. Sound machines can be useful at night or during quiet times. There are machines you can buy at the store. Or, you can find apps on your phone that make sounds, like the ocean or rainfall. Fish tanks, fans, quiet music, and indoor waterfalls can help, as well. Ways to manage/cope with tinnitus  Some tinnitus may last a long time. To manage your tinnitus, try to:  · Avoid noises that you think caused your tinnitus. If you can't avoid loud noises, wear earplugs or earmuffs. · Ignore the sound by paying attention to other things. Keeping your brain busy with other tasks or background noise can help your brain not focus on the tinnitus. · Try to not give the tinnitus an emotional reaction. Do your best to ignore the sound and not let it bother you. Relax using biofeedback, meditation, or yoga. Feeling stressed and being tired can make tinnitus worse. · Play music or white noise to help you sleep. Background noise may cover up the noise that you hear in your ears. You can buy a tabletop machine or a device that sits under your pillow to play soothing sounds, like ocean waves. · Smart phones have free apps, such as Whist, Relax Melodies, ReSound Relief, and White Noise Lite. These apps have different types of sounds/noise, some of which you can blend together to find sounds that are most soothing to you. · Hearing aid technology, especially when there is some hearing loss, may help reduce tinnitus symptoms by giving your brain better access to the sounds it is missing.   There are some hearing aids for it to be successful. Tips as a Listener:   1. Control your environment. It is important to limit the amount of background noise in the room when possible. You should also consider having a good light source in the room to best see the other person. 2. Ask for clarification. Instead of saying \"What?\", you can use parts of what you heard to make a new question. For example, if you heard the word \"Thursday\" but not the rest of the week, you may ask \"What was that about Thursday? \" or \"What did you want to do Thursday? \". This shows the person talking that you are listening and will help them better explain what they are saying. 3. Be an advocate for yourself. If you are hearing but not understanding, tell the other person \"I can hear you, but I need you to slow down when you speak. \"  Or if someone is facing the other direction, say \"I cannot hear you when you are not looking at me when we talk. \"       Tips as a Talker:   - Sit or stand 3 to 6 feet away to maximize audibility         -- It is unrealistic to believe someone else will fully hear your message if you are speaking from across the room or in a different room in the house   - Stay at eye level to help with visual cues   - Make sure you have the persons attention before speaking   - Use facial expressions and gestures to accentuate your message   - Raise your voice slightly (do not scream)   - Speak slowly and distinctly   - Use short, simple sentences   - Rephrase your words if the person is having a hard time understanding you    - To avoid distortion, dont speak directly into a persons ear      Some additional items that may be helpful:   - Remain patient - this is important for both parties   - Write down items that still cannot be heard/understood. You may write with pen/paper or consider typing/texting on a cell phone or smart device. - If background noise is unavoidable, try to keep yourself in a good position in the room.   By sitting free of anything you might trip on.  ? Letting family and friends know that you have been feeling dizzy. This will help them know how to help you. When should you call for help? Call 911 anytime you think you may need emergency care. For example, call if:    · You passed out (lost consciousness). · You have dizziness along with symptoms of a heart attack. These may include:  ? Chest pain or pressure, or a strange feeling in the chest.  ? Sweating. ? Shortness of breath. ? Nausea or vomiting. ? Pain, pressure, or a strange feeling in the back, neck, jaw, or upper belly or in one or both shoulders or arms. ? Lightheadedness or sudden weakness. ? A fast or irregular heartbeat. · You have symptoms of a stroke. These may include:  ? Sudden numbness, tingling, weakness, or loss of movement in your face, arm, or leg, especially on only one side of your body. ? Sudden vision changes. ? Sudden trouble speaking. ? Sudden confusion or trouble understanding simple statements. ? Sudden problems with walking or balance. ? A sudden, severe headache that is different from past headaches. Call your doctor now or seek immediate medical care if:    · You feel dizzy and have a fever, headache, or ringing in your ears. · You have new or increased nausea and vomiting. · Your dizziness does not go away or comes back. Watch closely for changes in your health, and be sure to contact your doctor if:    · You do not get better as expected. Where can you learn more? Go to https://[email or web address].OpTier. org and sign in to your YoungCurrent account. Enter E423 in the [employer].mx box to learn more about \"Dizziness: Care Instructions. \"     If you do not have an account, please click on the \"Sign Up Now\" link. Current as of: September 23, 2018  Content Version: 11.9  © 3352-6610 [employer], Incorporated. Care instructions adapted under license by [facility] ([facility]).  If you have questions about a plenty of fluids. Choose water and other clear liquids. If you have kidney, heart, or liver disease and have to limit fluids, talk with your doctor before you increase the amount of fluids you drink. Preventing falls at home   Remove raised doorway thresholds, throw rugs, and clutter. Repair loose carpet or raised areas in the floor. [facility] furniture and electrical cords to keep them out of walking paths.  Use nonskid floor wax, and wipe up spills right away, especially on ceramic tile floors.  If you use a walker or cane, put rubber tips on it. If you use crutches, clean the bottoms of them regularly with an abrasive pad, such as steel wool.  Keep your house well lit, especially [facility], and outside walkways. Use night-lights in areas such as hallways and bathrooms. Add extra light switches or use remote switches (such as switches that go on or off when you clap your hands) to make it easier to turn lights on if you have to get up during the night.  Install sturdy handrails on stairways.  Move items in your cabinets so that the things you use a lot are on the lower shelves (about waist level).  Keep a cordless phone and a flashlight with new batteries by your bed. If possible, put a phone in each of the main rooms of your house, or carry a cell phone in case you fall and cannot reach a phone. Or, you can wear a device around your neck or wrist. You push a button that sends a signal for help.  Wear low-heeled shoes that fit well and give your feet good support. Use footwear with nonskid soles. Check the heels and soles of your shoes for wear. Repair or replace worn heels or soles.  Do not wear socks without shoes on wood floors.  Walk on the grass when the sidewalks are slippery. If you live in an area that gets snow and ice in the winter, sprinkle salt on slippery steps and sidewalks. Or ask a family member or friend to do this for you.   Preventing falls in the [facility] bars and nonskid mats inside and outside your shower or tub and near the toilet and sinks.  Use shower chairs and bath benches.  Use a hand-held shower head that will allow you to sit while showering.  Get into a tub or shower by putting the weaker leg in first. Get out of a tub or shower with your strong side first.   Repair loose toilet seats and consider installing a raised toilet seat to make getting on and off the toilet easier.  Keep your bathroom door unlocked while you are in the shower. Where can you learn more? Go to https://[email or web address].[email or web address] org and sign in to your [employer]iza account. Enter [id number] in the [employer] box to learn more about \"Preventing Falls: Care Instructions. \"     If you do not have an account, please click on the \"Sign Up Now\" link. Current as of: September 8, 2021               Content Version: 13.3  © 2006-2022 Healthwise, Incorporated. Care instructions adapted under license by [facility] ([facility]). If you have questions about a medical condition or this instruction, always ask your healthcare professional. [place] [address] any warranty or liability for your use of this information.

## 2022-07-20 ENCOUNTER — HOSPITAL ENCOUNTER (OUTPATIENT)
Dept: MRI IMAGING | Age: 52
Discharge: HOME | End: 2022-07-20
Payer: COMMERCIAL

## 2022-07-20 DIAGNOSIS — H91.8X9 ASYMMETRICAL HEARING LOSS: ICD-10-CM

## 2022-07-20 DIAGNOSIS — R42 VERTIGO: ICD-10-CM

## 2022-07-20 PROCEDURE — 70553 MRI BRAIN STEM W/O & W/DYE: CPT

## 2022-07-20 PROCEDURE — A9577 INJ MULTIHANCE: HCPCS | Performed by: STUDENT IN AN ORGANIZED HEALTH CARE EDUCATION/TRAINING PROGRAM

## 2022-07-20 PROCEDURE — 6360000004 HC RX CONTRAST MEDICATION: Performed by: STUDENT IN AN ORGANIZED HEALTH CARE EDUCATION/TRAINING PROGRAM

## 2022-07-20 RX ADMIN — GADOBENATE DIMEGLUMINE 10 ML: 529 INJECTION, SOLUTION INTRAVENOUS at 09:18

## 2022-07-27 ENCOUNTER — OFFICE VISIT (OUTPATIENT)
Dept: ENT CLINIC | Age: 52
End: 2022-07-27
Payer: COMMERCIAL

## 2022-07-27 VITALS
HEART RATE: 86 BPM | DIASTOLIC BLOOD PRESSURE: 68 MMHG | BODY MASS INDEX: 18.32 KG/M2 | WEIGHT: 114 LBS | RESPIRATION RATE: 16 BRPM | SYSTOLIC BLOOD PRESSURE: 108 MMHG | HEIGHT: 66 IN

## 2022-07-27 DIAGNOSIS — R09.81 NASAL CONGESTION: ICD-10-CM

## 2022-07-27 DIAGNOSIS — H91.8X9 ASYMMETRICAL HEARING LOSS: ICD-10-CM

## 2022-07-27 DIAGNOSIS — J34.89 NASAL OBSTRUCTION: ICD-10-CM

## 2022-07-27 DIAGNOSIS — J31.0 CHRONIC RHINITIS: ICD-10-CM

## 2022-07-27 DIAGNOSIS — R42 DIZZINESS: Primary | ICD-10-CM

## 2022-07-27 DIAGNOSIS — K13.79 LESION OF HARD PALATE: ICD-10-CM

## 2022-07-27 DIAGNOSIS — K14.8 TONGUE LESION: ICD-10-CM

## 2022-07-27 PROCEDURE — 99214 OFFICE O/P EST MOD 30 MIN: CPT | Performed by: STUDENT IN AN ORGANIZED HEALTH CARE EDUCATION/TRAINING PROGRAM

## 2022-07-27 RX ORDER — HYOSCYAMINE SULFATE 0.125 MG
TABLET ORAL
COMMUNITY
Start: 2022-07-22

## 2022-07-27 RX ORDER — AZELASTINE 1 MG/ML
1 SPRAY, METERED NASAL 2 TIMES DAILY
Qty: 60 ML | Refills: 1 | Status: SHIPPED | OUTPATIENT
Start: 2022-07-27

## 2022-07-27 RX ORDER — FEXOFENADINE HCL 180 MG/1
180 TABLET ORAL DAILY
Qty: 30 TABLET | Refills: 3 | Status: SHIPPED | OUTPATIENT
Start: 2022-07-27

## 2022-07-27 NOTE — PROGRESS NOTES
[address] (:  1970) is a 50 y.o. female, here for evaluation of the following chief complaint(s):  Results      ASSESSMENT/PLAN:  1. Dizziness  -     PT vestibular rehab; Future  2. Lesion of hard palate  3. Asymmetrical hearing loss  4. Nasal congestion  5. Nasal obstruction  6. Chronic rhinitis  7. Tongue lesion      This is a very pleasant 50 y.o. female here today for evaluation of the the above-noted complaints.      -Left lateral tongue lesion stable  -Central hard palate lesion likely irritation from denture plate. We will follow this.  -Nasopharyngeal fullness likely due to exuberant adenoid tissue. MRI does not show an invasive process.    -Start azelastine  -Start fexofenadine  -Continue saline irrigation  -Discussed with patient that we could consider referral to allergy  -We discussed that some of her issues could be related to her work environment, although it is hard to say for certain. -MRI IAC with no evidence of retrocochlear pathology. -Referral to vestibular therapy for dizziness  -Start magnesium oxide and riboflavin 400 mg daily for possible vestibular migraine component. Medical Decision Making: The following items were considered in medical decision making:  Independent review of images  Review / order clinical lab tests  Review / order radiology tests  Decision to obtain old records  Review and summation of old records as accessed through [facility] if applicable    SUBJECTIVE/OBJECTIVE:  GHAZAL Morillo is here today for evaluation of issues related to her nose and ear. Patient states that she has 3 to 4 months of episodic dizziness. She describes these as true vertigo. They will usually last for brief periods of time from a few seconds to a few minutes. They are exacerbated with leftward head turn when she is lying in the bed.   She can also get recurrence of her symptoms when she goes from a seated to standing position. She has had 2 falls since this occurred. She also notes frequent headaches. In the past, she has had issues related to multiple ear infections on the left side as well as an ear tube in the left ear. Patient denies otorrhea, syncope, fluctuating hearing, aural fullness. Patient also has issues related to multiple sinonasal complaints. Patient states that she gets intermittent facial pain and pressure which she describes as located in between her eyes and central aspect of her forehead. She gets nasal obstruction which alternates from side to side but is persistent. She gets purulent nasal drainage every couple weeks. She recently trialed fluticasone. She is unclear if this is helped her symptoms. Her sense of smell is diminished. She also has a history of epistaxis on the left side. Patient notes that she has a history of heavy metal exposure through her work and [employer], particularly nickel. Update 7/27/2022:    Patient presents today for issues related to her ear and nose. She has persistent dizziness. This is impacting her ability to work. She tried to fluticasone but still gets facial pain and pressure. She has a lesion on her hard palate that she wants examined. She is still getting nasal drainage which is clear. She denies improvement with the Flonase. She denies purulent drainage. REVIEW OF SYSTEMS  The following systems were reviewed and revealed the following in addition to any already discussed in the HPI:    PHYSICAL EXAM    GENERAL: No acute distress, alert and oriented, no hoarseness, strong voice  EYES: EOMI, Anti-icteric  HENT:   Head: Normocephalic and atraumatic.    Face:  Symmetric, facial nerve intact  Right Ear: Normal external ear, normal external auditory canal, intact tympanic membrane with normal mobility and aerated middle ear  Left Ear: Normal external ear, normal external auditory canal, intact tympanic membrane with normal mobility and aerated middle ear  Mouth/Oral Cavity:  normal lips, Uvula is midline, no mucosal lesions, no trismus, without dentition, normal salivary quality/flow  Oropharynx/Larynx:  normal oropharynx, unremarkable tonsils  Nose:Normal external nasal appearance. Anterior rhinoscopy shows  a deviated septum preventing view posteriorly. Swelling of turbinates. Normal mucosa   NECK: Normal range of motion, no thyromegaly, trachea is midline, no lymphadenopathy, no neck masses, no crepitus  CHEST: Normal respiratory effort, no retractions, breathing comfortably  SKIN: No rashes, normal appearing skin, no evidence of skin lesions/tumors  Neuro:  cranial nerve II-XII intact; normal gait  Cardio:  no edema    2 mm flat white lesion of the anterolateral left tongue, stable  There is evidence of slight mucosal irregularity at the juncture of the denture plate in the hard palate. No evidence of underlying mass lesion    Ricky-Hallpike negative bilaterally    PROCEDURE  Nasal Endoscopy (CPT code 82728)-no charge       Due to the patients chronic sinus disease and/or history of sinonasal neoplasm for surveillance a nasal endoscopy with or without debridement will be performed to complete a significant physical examination of the patient which cannot be performed by anterior rhinoscopy alone (failure of complete examination of the paranasal sinuses). Failure to provide this procedure may lead to late detection of significant chronic benign disease, acute exacerbation, resolution or failure of early diagnosis of recurrent cancer. The procedure report is present in the body of the chart. Verbal consent was received. After topical anesthesia and decongestion had been obtained using aerosolized 1% lidocaine and oxymetazoline, a 45 degree rigid endoscope was placed into both nares with the patient in a sitting position.  The following was observed:        Septum: intact and deviated   Other: -The inferior and middle turbinates were examined. The middle meatus, and sphenoethmoid recess was examined bilaterally.    -There is evidence of inferior turbinate hypertrophy and clear secretions. There is erythematous mucosa. There is some fullness of the nasopharynx which appears to be persistent adenoid tissue. No evidence of ulceration or bleeding. This is stable from last time.  -There were no complications. Tolerated well without complication. I attest that I was present for and did the entire procedure myself. This note was generated completely or in part utilizing Dragon dictation speech recognition software. Occasionally, words are mistranscribed and despite editing, the text may contain inaccuracies due to incorrect word recognition. If further clarification is needed please contact the office at [phone]. An electronic signature was used to authenticate this note.     --Manpreet Rondon MD

## 2022-08-03 ENCOUNTER — OFFICE VISIT (OUTPATIENT)
Dept: ENT CLINIC | Age: 52
End: 2022-08-03
Payer: COMMERCIAL

## 2022-08-03 VITALS — SYSTOLIC BLOOD PRESSURE: 117 MMHG | DIASTOLIC BLOOD PRESSURE: 80 MMHG

## 2022-08-03 DIAGNOSIS — R42 DIZZINESS: ICD-10-CM

## 2022-08-03 DIAGNOSIS — R49.9 CHANGE IN VOICE: ICD-10-CM

## 2022-08-03 DIAGNOSIS — G37.9 DEMYELINATING LESION (HCC): Primary | ICD-10-CM

## 2022-08-03 DIAGNOSIS — R42 VERTIGO: ICD-10-CM

## 2022-08-03 PROCEDURE — 99214 OFFICE O/P EST MOD 30 MIN: CPT | Performed by: STUDENT IN AN ORGANIZED HEALTH CARE EDUCATION/TRAINING PROGRAM

## 2022-08-03 NOTE — PROGRESS NOTES
following items were considered in medical decision making:  Independent review of images  Review / order clinical lab tests  Review / order radiology tests  Decision to obtain old records  Review and summation of old records as accessed through [facility] if applicable    SUBJECTIVE/OBJECTIVE:  HPI    Ethelle Halo is here today for evaluation of issues related to her nose and ear. Patient states that she has 3 to 4 months of episodic dizziness. She describes these as true vertigo. They will usually last for brief periods of time from a few seconds to a few minutes. They are exacerbated with leftward head turn when she is lying in the bed. She can also get recurrence of her symptoms when she goes from a seated to standing position. She has had 2 falls since this occurred. She also notes frequent headaches. In the past, she has had issues related to multiple ear infections on the left side as well as an ear tube in the left ear. Patient denies otorrhea, syncope, fluctuating hearing, aural fullness. Patient also has issues related to multiple sinonasal complaints. Patient states that she gets intermittent facial pain and pressure which she describes as located in between her eyes and central aspect of her forehead. She gets nasal obstruction which alternates from side to side but is persistent. She gets purulent nasal drainage every couple weeks. She recently trialed fluticasone. She is unclear if this is helped her symptoms. Her sense of smell is diminished. She also has a history of epistaxis on the left side. Patient notes that she has a history of heavy metal exposure through her work and [employer], particularly nickel. Update 7/27/2022:    Patient presents today for issues related to her ear and nose. She has persistent dizziness. This is impacting her ability to work. She tried to fluticasone but still gets facial pain and pressure.   She has a lesion on her hard palate rigid endoscope was placed into both nares with the patient in a sitting position. The following was observed:        Septum: intact and deviated   Other:   -The inferior and middle turbinates were examined. The middle meatus, and sphenoethmoid recess was examined bilaterally.    -There is evidence of inferior turbinate hypertrophy and clear secretions. There is erythematous mucosa. There is some fullness of the nasopharynx which appears to be persistent adenoid tissue. No evidence of ulceration or bleeding. This is stable from last time.  -There were no complications. Tolerated well without complication. I attest that I was present for and did the entire procedure myself. This note was generated completely or in part utilizing Dragon dictation speech recognition software. Occasionally, words are mistranscribed and despite editing, the text may contain inaccuracies due to incorrect word recognition. If further clarification is needed please contact the office at [phone]. An electronic signature was used to authenticate this note.     --Amando Woody MD

## 2022-08-04 ENCOUNTER — HOSPITAL ENCOUNTER (OUTPATIENT)
Dept: PHYSICAL THERAPY | Age: 52
Setting detail: THERAPIES SERIES
Discharge: HOME | End: 2022-08-04
Payer: COMMERCIAL

## 2022-08-04 ENCOUNTER — TELEPHONE (OUTPATIENT)
Dept: ENT CLINIC | Age: 52
End: 2022-08-04

## 2022-08-04 PROCEDURE — 97530 THERAPEUTIC ACTIVITIES: CPT

## 2022-08-04 PROCEDURE — 97161 PT EVAL LOW COMPLEX 20 MIN: CPT

## 2022-08-04 PROCEDURE — 97112 NEUROMUSCULAR REEDUCATION: CPT

## 2022-08-04 NOTE — PLAN OF CARE
[address] [facility],[address], [place]  [address] [facility]  Phone: [phone]   Fax:     [phone]                                                       Physical Therapy Certification    Dear Referring Provider (secondary): Dr. Maria Esther Graves,    We had the pleasure of evaluating the following patient for physical therapy services at [address]. A summary of our findings can be found in the initial assessment below. This includes our plan of care. If you have any questions or concerns regarding these findings, please do not hesitate to contact me at the office phone number checked above. Thank you for the referral.       Physician Signature:_______________________________Date:__________________  By signing above (or electronic signature), therapists plan is approved by physician          Patient: Quique Treadwell   : 1970   MRN: [id number]  Referring Physician: Referring Provider (secondary): Dr. Maria Esther Graves      Evaluation Date: 2022        Medical Diagnosis Information:  Diagnosis: R42 Dizzines   Treatment Diagnosis: dizziness limiting work and ADLs                                           Insurance information: PT Insurance Information: Tnoja Damion     Precautions/ Contra-indications:   Latex Allergy:  [x]NO      []YES  Preferred Language for Healthcare:   [x]English       []other:    C-SSRS Triggered by Intake questionnaire (Past 2 wk assessment ):   [] No, Questionnaire did not trigger screening. [x] Yes, Patient intake triggered C-SSRS Screening      [x] C-SSRS Screening completed  [x] PCP notified via Epic    Plans to start sessions with counselor     C-SSRS Triggered by Intake questionnaire:     YES NO   In the past month, have you wished you were dead or wished you could go to sleep and not wake up? X   In the past month, have you had any thoughts of killing yourself?   X                    Lifetime   YES NO   Have you ever done anything, started to do anything, or prepared to do anything to end your life? X             Past 3 months    X          SUBJECTIVE: Patient stated complaint: Pt notes h/o allergy and sinus trouble with frequent ear infections on L side. PT notes onset of fullness/clogging in R ear back in May with onset of \"dizziness\" that feels like off balance, nauseous with movement. (Bending up/down, bed mobility, riding in car). MRI done to see neurologist for second opinion ( demyelinating lesion?) and to rule out vestibular migraine component. Relevant Medical History: Additional Pertinent Hx: anxiety  Pain Scale: 2-3/10 in neck; 5-6/10 HA pains weekly      Type: []Constant   [x]Intermittent  []Radiating []Localized []other:    Dizziness Scale: 5/10    Description of symptoms: [] Vertigo [x]  Off-balance [] Lightheadedness    Symptoms are getting:  [] Better [] Worse  [x] Same      [] Episodic    Description of Spells: [] Constant [] Spontaneous [x] Motion Induced [x] Induced by position changes [] Other:    Length of time spells occur: [] Seconds [x] Minutes  [] Hours [] Days [] Other:     Date of onset: 5/1/22    Setting in which symptoms first occurred: walking up steps     What increases/provokes symptoms? Riding car, bed mobility, bending     What decreases/eases symptoms?  Sit still     Hearing impairments:  [x] Yes  [] No  [] Other:some loss      Hearing changes since onset:  [x] Yes  [] No  [x] Other:fullness R ear    Visual changes since onset: [] Yes  [x] No  [] Other:    Recent falls:    [x] Yes  [] No     Comments:      History of migraines/HA:  [x] Yes  [] No    Comments:    Previous treatments: no    Job requirements/work status: off    Occupation/School: work in [employer]. ( sit, stand, bend, twist, lift)     Living Status/Prior Level of Function: Prior to this injury / incident, patient was independent with ADLs and IADLs,     OBJECTIVE:     Musculoskeletal Screen  Cervical spine complaints: some pain intermittently from dog bites as child  Cervical Pain:   Cervical spine ROM:  []  WNL [] Impaired:    LE ROM:NT    LEFT RIGHT    Hip      Knee      Ankle       LE Strength:NT   LEFT RIGHT    Hip flexion      Hip IR      Hip ER      Knee extension      Knee flexion      Ankle       Posture:  fwd shoulder - corrected with cues     Somatosensory:NT  Light touch:  [] Normal [] Impaired Comments:    Coordination:NT   Rapid alternating movements: [] Normal [] Dysdiadokinesia   Finger to Nose:   [] Normal [] Dysmetric  Heel to Shin:    [] Normal [] Ataxic    Postural Control Tests:  Clinical Test of Sensory Interaction for Balance (CTSIB) performed in Romberg stance  CONDITION TIME STRATEGY SWAY    Eyes open, firm surface       Eyes closed, firm surface       Eyes open, foam surface       Eyes closed, foam surface        Tandem stance:     Gait:    Assistive Device: no     Orthosis: no   At self-initiated pace:  Dorys: dec   MAURICIO: inc        Arm swing: dec   Head/trunk rotation: dec      Straight path: y  Swerves: n      Staggers: n   Side-steps: n   Gait speed: dec    Oculomotor/Vestibular Examination:    Spontaneous nystagmus:  [] Left  [] Right [x] Absent  Gaze-Evoked nystagmus with fixation present:   Primary [] Present [x] Absent   Right  [] Present [x] Absent   Left  [] Present [x] Absent  VOR Head Thrust Test: [x] Normal [] Abnormal  Comments: difficulty relaxing for test  VOR Cancellation:  [] Normal [x] Abnormal Comments: mild corrections   Smooth Pursuit:  [x] Normal [] Abnormal Comments:   Saccades:   [x] Normal [] Abnormal Comments: very mild jerkiness R eye   Convergence:   [x] Normal [] Abnormal Comments:   Static Visual Acuity:    Dynamic Visual Acuity:       Vertebral artery testing - seated only mild L neck pain       Positional Testing  R Hallpike-Vail maneuver:    Nystagmus:  [] Yes  [x] No  [] Duration:  Pressure in R ear and head that persisted      [] Direction:    Vertigo:  [] Yes  [x] No  [] Duration:   L Hallpike-Ricky maneuver:   Nystagmus:  [] Yes  [x] No  [] Duration:       [] Direction:    Vertigo:  [] Yes  [x] No  [] Duration:   Supine roll head right:   Nystagmus:  [] Yes  [x] No  [] Duration:       [] Direction:    Vertigo:  [] Yes  [x] No  [] Duration:   Supine roll head left:   Nystagmus:  [] Yes  [x] No  [] Duration:       [] Direction: []   Vertigo:  [] Yes   No  [] Duration:     THOM: 1 N, 2 N, 3 N, 4 S, 5 S, 6 F, 7 NT     Outcome Measures  FOTO- Vestibular = 52    [x] Patient history, allergies, meds reviewed. Medical chart reviewed. See intake form. Review of Systems (ROS):  [x]Performed Review of systems (Integumentary, Cardiopulmonary, Neurological) by intake and observation. Intake form has been scanned into medical record. Patient has been instructed to contact their primary care physician regarding ROS issues if not already being addressed at this time.       Co-morbidities/Complexities (which will affect course of rehabilitation):   []None           Arthritic conditions   []Rheumatoid arthritis (M05.9)  []Osteoarthritis (M19.91)   Cardiovascular conditions   []Hypertension (I10)  []Hyperlipidemia (E78.5)  []Angina pectoris (I20)  []Atherosclerosis (I70)   Musculoskeletal conditions   []Disc pathology   []Congenital spine pathologies   []Prior surgical intervention  []Osteoporosis (M81.8)  []Osteopenia (M85.8)   Endocrine conditions   []Hypothyroid (E03.9)  []Hyperthyroid Gastrointestinal conditions   []Constipation (B04.14)   Metabolic conditions   []Morbid obesity (E66.01)  []Diabetes type 1(E10.65) or 2 (E11.65)   []Neuropathy (G60.9)     Pulmonary conditions   []Asthma (J45)  []Coughing   []COPD (J44.9)   Psychological Disorders  [x]Anxiety (F41.9)  [x]Depression (F32.9)   []Other:   [x]Other:     H/o migraines       Barriers to/and or personal factors that will affect rehab potential:              []Age  []Sex    []Smoker              []Motivation/Lack of Motivation [x]Co-Morbidities              []Cognitive Function, education/learning barriers              []Environmental, home barriers              []profession/work barriers  []past PT/medical experience  []other:  Justification:     ASSESSMENT:      Functional Impairments:  Problem List/Functional Limitations:   [] BPPV    [] Right [] Posterior Canal [] Canalithiasis    [] Left  [] Horizontal Canal [] Cupulolithiasis   [] Decreased Gaze Stabilization    [x] Increased Motion Sensitivity   [x] Unilateral Vestibular Hypofunction [] Bilateral Vestibular Hypofunction   [] Gait Instability    [x] Decreased tolerance for ADLs    [] Decreased functional strength   [] Reduced Balance/Proprioceptive control   [] Reduced ability to hear/focus   [] Noted cervical/thoracic/GHJ joint hypomobility   [] Noted cervical/thoracic/GHJ joint hypermobility   [] Decreased cervical/UE functional ROM   [] Noted Headache pain aggravated by neck movements with/without dizziness   [] Abnormal reflexes/sensation/myotomal/dermatomal deficits   [] Decreased DCF control or ability to hold head up   [] Decreased RC/scapular/core strength and neuromuscular control     Functional Activity Limitations (from functional questionnaire and intake)   []Reduced ability to tolerate prolonged functional positions   [x]Reduced ability or difficulty with changes of positions or transfers between positions  [x]Reduced ability to transfer in/out of bed or rolling in bed   [x]Reduced ability or tolerance with driving, reading and/or computer work   [x]Reduced ability to perform lifting, reaching, carrying tasks   [x]Reduced ability to forward bend   []Reduced ability to ambulate prolonged functional periods/distances/surfaces   [x]Reduced ability to ascend/descend stairs  []Reduced ability to concentrate/focus  [x]Reduced ability to turn/pitch head rapidly  []Reduced ability to self-correct for losses of balance []other:       Participation Restrictions   [x]Reduced participation in self-care activities   [x]Reduced participation in home management activities   [x]Reduced participation in work activities   [x]Reduced participation in social activities   []Reduced participation in sport/recreational activities    Classification:   []Signs/symptoms consistent with BPPV (benign paroxysmal positional vertigo)      [x]Signs/symptoms consistent with unilateral peripheral vestibulopathy (i.e., vestibular neuritis, labyrinthitis, acoustic neuroma)   [x]Signs/symptoms consistent with possible central vestibulopathy   [x]Signs/symptoms consistent with possible migraine-related vestibulopathy  []Signs/symptoms consistent with Meniere's disease / post-traumatic hydrops  []Signs/symptoms consistent with perilymphatic fistula   []Signs/symptoms consistent with cervicogenic dizziness  []Signs/symptoms consistent with gait instability   []Signs/symptoms consistent with motion sensitivity    []signs/symptoms consistent with neck pain with mobility deficits     []signs/symptoms consistent with neck pain with movement coordinated impairments    []signs/symptoms consistent with neck pain with radiating pain    []signs/symptoms consistent with neck pain with headaches (cervicogenic)    []Signs/symptoms consistent with nerve root involvement including myotome & dermatome dysfunction   []sign/symptoms consistent with facet dysfunction of cervical and thoracic spine    []signs/symptoms consistent suggesting central cord compression/UMN syndromes   []signs/symptoms consistent with discogenic cervical pain   []signs/symptoms consistent with rib dysfunction   []signs/symptoms consistent with postural dysfunction   []signs/symptoms consistent with shoulder pathology    []signs/symptoms consistent with post-surgical status including decreased ROM, strength and function.    []signs/symptoms consistent with pathology which may benefit from Dry Needling  []signs/symptoms which may limit the use of advanced manual therapy techniques: (Elevated CV risk profile, recent trauma, intolerance to end range positions, prior TIA, visual issues, UE neurological compromise)   []other:      Prognosis/Rehab Potential:      []Excellent   [x]Good    [x]Fair   []Poor    Tolerance of evaluation/treatment:    []Excellent   [x]Good    [x]Fair   []Poor     Physical Therapy Evaluation Complexity Justification  [x] A history of present problem with:  [] no personal factors and/or comorbidities that impact the plan of care;  [x]1-2 personal factors and/or comorbidities that impact the plan of care  []3 personal factors and/or comorbidities that impact the plan of care  [x] An examination of body systems using standardized tests and measures addressing any of the following: body structures and functions (impairments), activity limitations, and/or participation restrictions;:  [x] a total of 1-2 or more elements   [] a total of 3 or more elements   [] a total of 4 or more elements   [x] A clinical presentation with:  [x] stable and/or uncomplicated characteristics   [] evolving clinical presentation with changing characteristics  [] unstable and unpredictable characteristics;   [x] Clinical decision making of [x] low, [] moderate, [] high complexity using standardized patient assessment instrument and/or measurable assessment of functional outcome. [x] EVAL (LOW) 11026 (typically 15 minutes face-to-face)  [] EVAL (MOD) 36306 (typically 30 minutes face-to-face)  [] EVAL (HIGH) 42953 (typically 45 minutes face-to-face)  [] RE-EVAL     HEP instruction: Written HEP instructions provided and reviewed. GOALS:  Patient stated goal: \"stop feeling dizzy\"  [x] Progressing: [] Met: [] Not Met: [] Adjusted    Therapist goals for Patient:   Short Term Goals: To be achieved in: 2 weeks  1. Independent in HEP and progression per patient tolerance, in order to prevent re-injury.    [x] Progressing: [] Met: [] Not Met: [] agents, E-stim, Biofeedback, US, iontophoresis as indicated  [x]  Gait training  [x]  Patient education on BPPV/vestibular function, balance, postural re-education, activity modification, progression of HEP. Electronically signed by:  LAUREN Deras [id number]    Note: If patient does not return for scheduled/recommended follow up visits, this note will serve as a discharge from care along with the most recent update on progress.

## 2022-08-04 NOTE — FLOWSHEET NOTE
[place] [facility], [facility]  [address] [place], [facility]  Phone: [phone]   Fax:     [phone]      Physical Therapy Treatment Note/ Progress Report:   Date:  2022    Patient Name:  Ralph Garza    :  1970  MRN: [id number]    Pertinent Medical History: Additional Pertinent Hx: anxiety    Medical/Treatment Diagnosis Information:  Diagnosis: R42 Dizzines  Treatment Diagnosis: dizziness limiting work and ADLs    Insurance/Certification information:  PT Insurance Information: Shaylee Peon - 30 visits per plan year ( 3/1-); no auth needed   Physician Information:  Referring Provider (secondary): Dr. Truman Matamoros of care signed (Y/N): []  Yes [x]  No     Date of Patient follow up with Physician:      Progress Report: []  Yes  [x]  No     Date Range for reporting period:  Beginnin2022  Ending:     Progress report due (10 Rx/or 30 days whichever is less):  3/9/36    Recertification due (POC duration/ or 90 days whichever is less):      Visit # Insurance Allowable Auth required? Date Range    30 v per plan yr 3/1- []  Yes [x]  No      Latex Allergy:  [x]NO      []YES  Preferred Language for Healthcare:   [x]English       []other:    Functional Outcomes Measure:   Date Assessed:  Test:FOTO  Score:52    Pain level:  2-3/10 in neck; 5-6/10 HA pains weekly      Dizziness level: 5/10    History of Injury:Patient stated complaint: Pt notes h/o allergy and sinus trouble with frequent ear infections on L side. Pt notes onset of fullness/clogging in R ear back in May with onset of \"dizziness\" that feels like off balance, nauseous with movement. (Bending up/down, bed mobility, riding in car). Pt saw ENT with negative testing for THE [facility]. MRI done and ENT is referring pt to see neurologist for second opinion ( r/o demyelinating lesion?) and to rule out vestibular migraine component. head left:              Nystagmus:     [] Yes             [x] No               [] Duration:                                          [] Direction:    []              Vertigo:            [] Yes              No                   [] Duration:     THOM testin N, 2 N, 3 N, 4 S, 5 S, 6 F, 7 NT - indicating possible UVL      RESTRICTIONS/PRECAUTIONS:     Exercises/Interventions:     Therapeutic Exercises (28401) Min: Resistance/Reps Notes/Cues                            Therapeutic Activities (35620) Min:10 See below    Tandem balance     SLS                    Neuromuscular Re-ed (38800) Min:15     Positional testing All negative - see above          Weekly vestibular protocol    Week 1 issued with x 1 viewing modified to sitting at 30 sec each          Airex:  Normal stance EO  NBOS EO     NBOS:  EC  Head turns  Head nods  Follow ball w/ eyes     Standing x 1 viewing      Amb   w/ head turns  W/ head nods  W/ EO/EC          Habituation:  Seated head turns  Seated head nods  Seated fwd bend     Sit stand EC     Basil Ahn          Manual Intervention (42515) Min:                Modalities  Min:                      Other Therapeutic Activities: Pt was educated on PT POC, Diagnosis, Prognosis, pathomechanics as well as frequency and duration of scheduling future physical therapy appointments. Time was also taken on this day to answer all patient questions and participation in PT. Reviewed appointment policy in detail with patient and patient verbalized understanding. X 10 min    Home Exercise Program: Patient was instructed in the following for HEP:     . Patient verbalized/demonstrated understanding and was issued written handout.   : week 1 vest protocol     Therapeutic Exercise and NMR EXR  [] (58760) Provided verbal/tactile cueing for activities related to strengthening, flexibility, endurance, ROM for improvements in LE, proximal hip, and core control with self care, mobility, lifting, ambulation.  [] (99659) EVAL (MOD) 37757 (typically 30 minutes face-to-face)  [] EVAL (HIGH) 31169 (typically 45 minutes face-to-face)  [] RE-EVAL     [] FQ(11536) x     [] Dry needle 1 or 2 Muscles (52877)  [x] NMR (15964) x     [] Dry needle 3+ Muscles (23948)  [] Manual (58307) x     [] Ultrasound (29819) x  [x] TA (38326) x     [] Mech Traction (87461)  [] ES(attended) (95892)     [] ES (un) (49153):   [] Vasopump (01701) [] Ionto (69445)   [] Other:    GOALS:  Patient stated goal: \"stop feeling dizzy\"  [x] Progressing: [] Met: [] Not Met: [] Adjusted     Therapist goals for Patient:  Short Term Goals: To be achieved in: 2 weeks  1. Independent in HEP and progression per patient tolerance, in order to prevent re-injury. [x] Progressing: [] Met: [] Not Met: [] Adjusted  2. Patient will have a decrease in dizziness/imbalance/symptoms by 20-30% to facilitate improvement in movement, function, balance and ADLs as indicated by Functional Deficits. [x] Progressing: [] Met: [] Not Met: [] Adjusted     Long Term Goals: To be achieved in:at d/c  1. Increase FOTO functional outcome score from 52 to 58 to assist with reaching prior level of function. [x] Progressing: [] Met: [] Not Met: [] Adjusted  2. Patient will have a decrease in dizziness/imbalance/symptoms by 50-60% to facilitate improvement in movement, function, balance and ADLs as indicated by Functional Deficits. [x] Progressing: [] Met: [] Not Met: [] Adjusted  3. Patient will demonstrate negative oculomotor special testing/positional testing as indicated by patients Functional Deficits. [x] Progressing: [] Met: [] Not Met: [] Adjusted  4. Patient will return to functional activities including self care and light housework without increased symptoms or restriction. [x] Progressing: [] Met: [] Not Met: [] Adjusted  5.  \"Return to work\"(patient specific functional goal)    [x] Progressing: [] Met: [] Not Met: [] Adjusted            ASSESSMENT:  pt presents with possible UVL and we will proceed with VRT; pt also has questionable MRI findings per Dr. Yokasta Contreras and w/ her h/o migraines she is having neurology consult per MD advice     Treatment/Activity Tolerance:  [x] Patient tolerated treatment well [] Patient limited by fatique  [] Patient limited by pain  [] Patient limited by other medical complications  [] Other:     Overall Progression Towards Functional goals/ Treatment Progress Update:  [] Patient is progressing as expected towards functional goals listed. [] Progression is slowed due to complexities/Impairments listed. [] Progression has been slowed due to co-morbidities. [x] Plan just implemented, too soon to assess goals progression <30days   [] Goals require adjustment due to lack of progress  [] Patient is not progressing as expected and requires additional follow up with physician  [] Other    Prognosis for POC: [x] Good [] Fair  [] Poor    Patient requires continued skilled intervention: [x] Yes  [] No        PLAN: Progress with VRT for possible UVL but assess after neurology consult  [] Continue per plan of care [] Alter current plan (see comments)  [x] Plan of care initiated [] Hold pending MD visit [] Discharge    Electronically signed by: Dunia Foreman, PTMPT [id number]    Note: If patient does not return for scheduled/recommended follow up visits, this note will serve as a discharge from care along with the most recent update on progress.

## 2022-08-04 NOTE — TELEPHONE ENCOUNTER
Pt calling about [facility] paperwork that was emailed to her employer; her employer told her she did not receive and pt is asking if we will fax instead.  Please fax to [phone] attn: [facility]

## 2022-08-10 ENCOUNTER — APPOINTMENT (OUTPATIENT)
Dept: PHYSICAL THERAPY | Age: 52
End: 2022-08-10
Payer: COMMERCIAL

## 2022-08-16 ENCOUNTER — HOSPITAL ENCOUNTER (OUTPATIENT)
Dept: PHYSICAL THERAPY | Age: 52
Setting detail: THERAPIES SERIES
Discharge: HOME | End: 2022-08-16
Payer: COMMERCIAL

## 2022-08-16 PROCEDURE — 97112 NEUROMUSCULAR REEDUCATION: CPT

## 2022-08-16 PROCEDURE — 97530 THERAPEUTIC ACTIVITIES: CPT

## 2022-08-16 NOTE — FLOWSHEET NOTE
[facility]  [address] [facility]  Phone: [phone]   Fax:     [phone]      Physical Therapy Treatment Note/ Progress Report:   Date:  2022    Patient Name:  Billy Cameron    :  1970  MRN: [id number]    Pertinent Medical History: Additional Pertinent Hx: anxiety    Medical/Treatment Diagnosis Information:  Diagnosis: R42 Dizzines  Treatment Diagnosis: dizziness limiting work and ADLs    Insurance/Certification information:  PT Insurance Information: Mary Buenrostro - 30 visits per plan year ( 3/1-); no auth needed   Physician Information:  Referring Provider (secondary): Dr. Candy Troy of care signed (Y/N): []  Yes [x]  No     Date of Patient follow up with Physician:      Progress Report: []  Yes  [x]  No     Date Range for reporting period:  Beginnin2022  Ending:     Progress report due (10 Rx/or 30 days whichever is less):  23    Recertification due (POC duration/ or 90 days whichever is less):      Visit # Insurance Allowable Auth required? Date Range    30 v per plan yr 3/1- []  Yes [x]  No      Latex Allergy:  [x]NO      []YES  Preferred Language for Healthcare:   [x]English       []other:    Functional Outcomes Measure:   Date Assessed:  Test:FOTO  Score:52    Pain level:  3/10 HA but no neck pain today       Dizziness level: 4-5/10    History of Injury:Patient stated complaint: Pt notes h/o allergy and sinus trouble with frequent ear infections on L side. Pt notes onset of fullness/clogging in R ear back in May with onset of \"dizziness\" that feels like off balance, nauseous with movement. (Bending up/down, bed mobility, riding in car). Pt saw ENT with negative testing for THE [facility]. MRI done and ENT is referring pt to see neurologist for second opinion ( r/o demyelinating lesion?) and to rule out vestibular migraine component.        Pt has lost 20 pounds since May and is not intentionally trying to loose weight. Pt notes unable to eat as much as she could. Saw GI doctor (has ulcer and reflux).     SUBJECTIVE:  See eval  8/16: feels week 1 of vestibular protocol is helpful b/c she is not getting as dizzy in certain positions, but feels dizzy today; to see neurologist on 8/30 and then will f/u with ENT after     OBJECTIVE:   Oculomotor/Vestibular Examination:     Spontaneous nystagmus:       [] Left             [] Right           [x] Absent  Gaze-Evoked nystagmus with fixation present:              Primary            [] Present       [x] Absent              Right                [] Present       [x] Absent              Left                  [] Present       [x] Absent  VOR Head Thrust Test:          [x] Normal       [] Abnormal    Comments: difficulty relaxing for test, pt jerked head every time rotational thrust was attempted   VOR Cancellation:                  [] Normal       [x] Abnormal    Comments: mild corrections   Smooth Pursuit:                      [x] Normal       [] Abnormal    Comments:  Saccades:                               [x] Normal       [] Abnormal    Comments: very mild jerkiness R eye   Convergence:                          [x] Normal       [] Abnormal    Comments:  Static Visual Acuity:                  Dynamic Visual Acuity:        Vertebral artery testing - seated only mild L neck pain         Positional Testing  R Hallpike-Leon maneuver:              Nystagmus:     [] Yes             [x] No               [] Duration:    Pressure in R ear and head that persisted, but no dizziness, nystagmus or obvious start/stop of symptoms                                      [] Direction:                  Vertigo:            [] Yes             [x] No               [] Duration:  L Hallpike-Leon maneuver:              Nystagmus:     [] Yes             [x] No               [] Duration:                                          [] Direction: Vertigo:            [] Yes             [x] No               [] Duration:  Supine roll head right:              Nystagmus:     [] Yes             [x] No               [] Duration:                                          [] Direction:                  Vertigo:            [] Yes             [x] No               [] Duration:   Supine roll head left:              Nystagmus:     [] Yes             [x] No               [] Duration:                                          [] Direction:    []              Vertigo:            [] Yes              No                   [] Duration:     THOM testin N, 2 N, 3 N, 4 S, 5 S, 6 F, 7 NT - indicating possible UVL      RESTRICTIONS/PRECAUTIONS:     Exercises/Interventions:     Therapeutic Exercises (01008) Min: Resistance/Reps Notes/Cues                            Therapeutic Activities (43227) Min:8    Tandem balance X 30 sec L/R    SLS X 30 sec L/R                   Neuromuscular Re-ed (90151) Min:32     Positional testing NT         Weekly vestibular protocol    Week 2 issued with practicing of active head/eye movement          Airex:  Normal stance EC  NBOS EO   X 30 sec   X 30 sec     NBOS:  EC  Head turns  Head nods  Follow ball w/ eyes   X 30 sec   X 10  X 10   X 30 sec       Standing x 1 viewing  Normal stance x 1 min up/down x 1 min s/s Cues to avoid pause mid way    Amb   w/ head turns  W/ head nods  W/ EO/EC   X 2 lengths  X 2 lengths  X 2 lengths          Habituation:  Seated head turns  Seated head nods  Seated fwd bend   2 X 10   2 x 10   2 X 5      Dizziness lasting 10 sec   Dizziness lasting 10 sec   Dizziness lasting 5 sec    Sit stand EC X 5  Weakness in LE   Basil Ahn add      : SBA-CGA with all balance ex   HA dec after ex 1/10 and dizziness intensity is the same 4/10    Manual Intervention (79030) Min:                Modalities  Min:                      Other Therapeutic Activities: Pt was educated on PT POC, Diagnosis, Prognosis, pathomechanics as well as frequency and duration of scheduling future physical therapy appointments. Time was also taken on this day to answer all patient questions and participation in PT. Reviewed appointment policy in detail with patient and patient verbalized understanding. X 10 min    Home Exercise Program: Patient was instructed in the following for HEP:     . Patient verbalized/demonstrated understanding and was issued written handout. 8/4: week 1 vest protocol   8/16: week 2, habituation head nods/turns/fwd bend     Therapeutic Exercise and NMR EXR  [] (67909) Provided verbal/tactile cueing for activities related to strengthening, flexibility, endurance, ROM for improvements in LE, proximal hip, and core control with self care, mobility, lifting, ambulation.  [] (20190) Provided verbal/tactile cueing for activities related to improving balance, coordination, kinesthetic sense, posture, motor skill, proprioception  to assist with LE, proximal hip, and core control in self care, mobility, lifting, ambulation and eccentric single leg control.  [address] and Therapeutic Activities:    [x] (84241 or 28475) Provided verbal/tactile cueing for activities related to improving balance, coordination, kinesthetic sense, posture, motor skill, proprioception and motor activation to allow for proper function of core, proximal hip and LE with self care and ADLs and functional mobility.   [] (04109) Gait Re-education- Provided training and instruction to the patient for proper LE, core and proximal hip recruitment and positioning and eccentric body weight control with ambulation re-education including up and down stairs     Home Exercise Program:    [x] (11624) Reviewed/Progressed HEP activities related to strengthening, flexibility, endurance, ROM of core, proximal hip and LE for functional self-care, mobility, lifting and ambulation/stair navigation   [] (50463)Reviewed/Progressed HEP activities related to improving balance, coordination, kinesthetic sense, posture, motor skill, proprioception of core, proximal hip and LE for self care, mobility, lifting, and ambulation/stair navigation      Manual Treatments:  PROM / STM / Oscillations-Mobs:  G-I, II, III, IV (PA's, Inf., Post.)  [] (64693) Provided manual therapy to mobilize LE, proximal hip and/or LS spine soft tissue/joints for the purpose of modulating pain, promoting relaxation,  increasing ROM, reducing/eliminating soft tissue swelling/inflammation/restriction, improving soft tissue extensibility and allowing for proper ROM for normal function with self care, mobility, lifting and ambulation. Charges:  Timed Code Treatment Minutes: 40   Total Treatment Minutes: 40      [] EVAL (LOW) 20791 (typically 20 minutes face-to-face)  [] EVAL (MOD) 39481 (typically 30 minutes face-to-face)  [] EVAL (HIGH) 46970 (typically 45 minutes face-to-face)  [] RE-EVAL     [] UQ(91099) x     [] Dry needle 1 or 2 Muscles (05166)  [x] NMR (22001) x  2   [] Dry needle 3+ Muscles (18856)  [] Manual (98858) x     [] Ultrasound (47967) x  [x] TA (12622) x     [] Mech Traction (00527)  [] ES(attended) (62825)     [] ES (un) (72007):   [] Vasopump (15883) [] Ionto (75893)   [] Other:    GOALS:  Patient stated goal: \"stop feeling dizzy\"  [x] Progressing: [] Met: [] Not Met: [] Adjusted     Therapist goals for Patient:  Short Term Goals: To be achieved in: 2 weeks  1. Independent in HEP and progression per patient tolerance, in order to prevent re-injury. [x] Progressing: [] Met: [] Not Met: [] Adjusted  2. Patient will have a decrease in dizziness/imbalance/symptoms by 20-30% to facilitate improvement in movement, function, balance and ADLs as indicated by Functional Deficits. [x] Progressing: [] Met: [] Not Met: [] Adjusted     Long Term Goals: To be achieved in:at d/c  1. Increase FOTO functional outcome score from 52 to 58 to assist with reaching prior level of function.   [x] Progressing: [] Met: [] Not Met: [] Adjusted  2. Patient will have a decrease in dizziness/imbalance/symptoms by 50-60% to facilitate improvement in movement, function, balance and ADLs as indicated by Functional Deficits. [x] Progressing: [] Met: [] Not Met: [] Adjusted  3. Patient will demonstrate negative oculomotor special testing/positional testing as indicated by patients Functional Deficits. [x] Progressing: [] Met: [] Not Met: [] Adjusted  4. Patient will return to functional activities including self care and light housework without increased symptoms or restriction. [x] Progressing: [] Met: [] Not Met: [] Adjusted  5. \"Return to work\"(patient specific functional goal)    [x] Progressing: [] Met: [] Not Met: [] Adjusted            ASSESSMENT:  pt presents with possible UVL and we will proceed with VRT; pt also has questionable MRI findings per Dr. Sarath Monge and w/ her h/o migraines she is having neurology consult per MD advice   8/16: reduction of HA and stable dizzy s/s after session; pt notes some dec in dizziness with consistency of week 1 protocol; cont for VRT     Treatment/Activity Tolerance:  [x] Patient tolerated treatment well [] Patient limited by fatique  [] Patient limited by pain  [] Patient limited by other medical complications  [] Other:     Overall Progression Towards Functional goals/ Treatment Progress Update:  [] Patient is progressing as expected towards functional goals listed. [] Progression is slowed due to complexities/Impairments listed. [] Progression has been slowed due to co-morbidities.   [x] Plan just implemented, too soon to assess goals progression <30days   [] Goals require adjustment due to lack of progress  [] Patient is not progressing as expected and requires additional follow up with physician  [] Other    Prognosis for POC: [x] Good [] Fair  [] Poor    Patient requires continued skilled intervention: [x] Yes  [] No        PLAN: Progress with VRT for possible UVL but assess after neurology consult  [x] Continue per plan of care [] Alter current plan (see comments)  [] Plan of care initiated [] Hold pending MD visit [] Discharge    Electronically signed by: Angelique Bansal, PTMSERA [id number]    Note: If patient does not return for scheduled/recommended follow up visits, this note will serve as a discharge from care along with the most recent update on progress.

## 2022-08-18 ENCOUNTER — HOSPITAL ENCOUNTER (OUTPATIENT)
Dept: PHYSICAL THERAPY | Age: 52
Setting detail: THERAPIES SERIES
Discharge: HOME | End: 2022-08-18
Payer: COMMERCIAL

## 2022-08-18 PROCEDURE — 97112 NEUROMUSCULAR REEDUCATION: CPT

## 2022-08-18 PROCEDURE — 97530 THERAPEUTIC ACTIVITIES: CPT

## 2022-08-18 NOTE — FLOWSHEET NOTE
lost 20 pounds since May and is not intentionally trying to loose weight. Pt notes unable to eat as much as she could. Saw GI doctor (has ulcer and reflux).     SUBJECTIVE:  See eval  8/16: feels week 1 of vestibular protocol is helpful b/c she is not getting as dizzy in certain positions, but feels dizzy today; to see neurologist on 8/30 and then will f/u with ENT after   8/18: felt \"drained\" after last session and needed to go home to nap, but felt ok when she woke up 2 hours later; pt notes the HA that she came in with on 8/16 was the end of a migraine she had all day Monday; today feeling ok     OBJECTIVE:   Oculomotor/Vestibular Examination:     Spontaneous nystagmus:       [] Left             [] Right           [x] Absent  Gaze-Evoked nystagmus with fixation present:              Primary            [] Present       [x] Absent              Right                [] Present       [x] Absent              Left                  [] Present       [x] Absent  VOR Head Thrust Test:          [x] Normal       [] Abnormal    Comments: difficulty relaxing for test, pt jerked head every time rotational thrust was attempted   VOR Cancellation:                  [] Normal       [x] Abnormal    Comments: mild corrections   Smooth Pursuit:                      [x] Normal       [] Abnormal    Comments:  Saccades:                               [x] Normal       [] Abnormal    Comments: very mild jerkiness R eye   Convergence:                          [x] Normal       [] Abnormal    Comments:  Static Visual Acuity:                  Dynamic Visual Acuity:        Vertebral artery testing - seated only mild L neck pain         Positional Testing  R Hallpike-Gilbert maneuver:              Nystagmus:     [] Yes             [x] No               [] Duration:    Pressure in R ear and head that persisted, but no dizziness, nystagmus or obvious start/stop of symptoms                                      [] Direction:                  Vertigo: [] Yes             [x] No               [] Duration:  L Hallpike-Ricky maneuver:              Nystagmus:     [] Yes             [x] No               [] Duration:                                          [] Direction:                  Vertigo:            [] Yes             [x] No               [] Duration:  Supine roll head right:              Nystagmus:     [] Yes             [x] No               [] Duration:                                          [] Direction:                  Vertigo:            [] Yes             [x] No               [] Duration:   Supine roll head left:              Nystagmus:     [] Yes             [x] No               [] Duration:                                          [] Direction:    []              Vertigo:            [] Yes              No                   [] Duration:     THOM testin N, 2 N, 3 N, 4 S, 5 S, 6 F, 7 NT - indicating possible UVL      RESTRICTIONS/PRECAUTIONS:     Exercises/Interventions:     Therapeutic Exercises (10418) Min: Resistance/Reps Notes/Cues                            Therapeutic Activities (08715) Min:8    Tandem balance X 30 sec L/R    SLS X 30 sec L/R                   Neuromuscular Re-ed (02422) Min:37     Positional testing NT         Weekly vestibular protocol    Week 2 reviewed with practicing of active head/eye movement x 30 sec s/s and up/dwn     Cues needed; difficulty         Airex:  Normal stance EC  NBOS EO  EC marching   X 30 sec   X 30 sec   X 20 sec - with resets to avoid falls     NBOS:  EC  Head turns  Head nods  Follow ball w/ eyes  Toss ball b/w hands and follow w/ eye   X 30 sec   X 10  X 10   X 30 sec   X 10       Standing x 1 viewing  Normal stance x 1 min up/down x 1 min s/s \"Twitching\" in cervical mm with ext   Rocker board w/ focus on hook  S/s x 30 sec   F/b x 30 sec     Amb   w/ head turns  W/ head nods  W/ EC  W/ ball toss to self   X 2 lengths  X 2 lengths  X 2 lengths   X 2 lengths     Fwd bending circles with small ball while tracking ball w/ eye  X 5 small/x 5 with fwd bend         Habituation:  Seated head turns  Seated head nods  Seated fwd bend   2 X 10   2 x 10   2 X 5      Dizziness lasting 10 sec   Dizziness lasting 8-10 sec   Dizziness lasting 8-10 sec    Sit stand EC 2 X 5  Weakness in LE noted    Angel Pitt X 3 reps  No dizziness      8/18: SBA-CGA with all balance ex   No HA and dizziness intensity is the same 1-2/10    Manual Intervention (99607) Min:                Modalities  Min:                      Other Therapeutic Activities: Pt was educated on PT POC, Diagnosis, Prognosis, pathomechanics as well as frequency and duration of scheduling future physical therapy appointments. Time was also taken on this day to answer all patient questions and participation in PT. Reviewed appointment policy in detail with patient and patient verbalized understanding. X 10 min    Home Exercise Program: Patient was instructed in the following for HEP:     . Patient verbalized/demonstrated understanding and was issued written handout. 8/4: week 1 vest protocol   8/16: week 2, habituation head nods/turns/fwd bend     Therapeutic Exercise and NMR EXR  [] (05413) Provided verbal/tactile cueing for activities related to strengthening, flexibility, endurance, ROM for improvements in LE, proximal hip, and core control with self care, mobility, lifting, ambulation.  [] (30359) Provided verbal/tactile cueing for activities related to improving balance, coordination, kinesthetic sense, posture, motor skill, proprioception  to assist with LE, proximal hip, and core control in self care, mobility, lifting, ambulation and eccentric single leg control.  [address] and Therapeutic Activities:    [x] (80003 or 89963) Provided verbal/tactile cueing for activities related to improving balance, coordination, kinesthetic sense, posture, motor skill, proprioception and motor activation to allow for proper function of core, proximal hip and LE with self care and ADLs and functional mobility.   [] (08863) Gait Re-education- Provided training and instruction to the patient for proper LE, core and proximal hip recruitment and positioning and eccentric body weight control with ambulation re-education including up and down stairs     Home Exercise Program:    [x] (80854) Reviewed/Progressed HEP activities related to strengthening, flexibility, endurance, ROM of core, proximal hip and LE for functional self-care, mobility, lifting and ambulation/stair navigation   [] (84453)Reviewed/Progressed HEP activities related to improving balance, coordination, kinesthetic sense, posture, motor skill, proprioception of core, proximal hip and LE for self care, mobility, lifting, and ambulation/stair navigation      Manual Treatments:  PROM / STM / Oscillations-Mobs:  G-I, II, III, IV (PA's, Inf., Post.)  [] (68326) Provided manual therapy to mobilize LE, proximal hip and/or LS spine soft tissue/joints for the purpose of modulating pain, promoting relaxation,  increasing ROM, reducing/eliminating soft tissue swelling/inflammation/restriction, improving soft tissue extensibility and allowing for proper ROM for normal function with self care, mobility, lifting and ambulation.        Charges:  Timed Code Treatment Minutes: 45   Total Treatment Minutes: 45      [] EVAL (LOW) 91405 (typically 20 minutes face-to-face)  [] EVAL (MOD) 02083 (typically 30 minutes face-to-face)  [] EVAL (HIGH) 87767 (typically 45 minutes face-to-face)  [] RE-EVAL     [] ID([id number]) x     [] Dry needle 1 or 2 Muscles (34307)  [x] NMR (58145) x  2   [] Dry needle 3+ Muscles (28645)  [] Manual (68362) x     [] Ultrasound (23305) x  [x] TA (76216) x     [] Mech Traction (98016)  [] ES(attended) (78947)     [] ES (un) (94756):   [] Vasopump (86219) [] Ionto (98861)   [] Other:    GOALS:  Patient stated goal: \"stop feeling dizzy\"  [x] Progressing: [] Met: [] Not Met: [] Adjusted     Therapist goals for Patient:  Short Term Goals: To be achieved in: 2 weeks  1. Independent in HEP and progression per patient tolerance, in order to prevent re-injury. [x] Progressing: [] Met: [] Not Met: [] Adjusted  2. Patient will have a decrease in dizziness/imbalance/symptoms by 20-30% to facilitate improvement in movement, function, balance and ADLs as indicated by Functional Deficits. [x] Progressing: [] Met: [] Not Met: [] Adjusted     Long Term Goals: To be achieved in:at d/c  1. Increase FOTO functional outcome score from 52 to 58 to assist with reaching prior level of function. [x] Progressing: [] Met: [] Not Met: [] Adjusted  2. Patient will have a decrease in dizziness/imbalance/symptoms by 50-60% to facilitate improvement in movement, function, balance and ADLs as indicated by Functional Deficits. [x] Progressing: [] Met: [] Not Met: [] Adjusted  3. Patient will demonstrate negative oculomotor special testing/positional testing as indicated by patients Functional Deficits. [x] Progressing: [] Met: [] Not Met: [] Adjusted  4. Patient will return to functional activities including self care and light housework without increased symptoms or restriction. [x] Progressing: [] Met: [] Not Met: [] Adjusted  5.  \"Return to work\"(patient specific functional goal)    [x] Progressing: [] Met: [] Not Met: [] Adjusted            ASSESSMENT:  pt presents with possible UVL and we will proceed with VRT; pt also has questionable MRI findings per Dr. Jessenia Meraz and w/ her h/o migraines she is having neurology consult per MD advice   8/16: reduction of HA and stable dizzy s/s after session; pt notes some dec in dizziness with consistency of week 1 protocol; cont for VRT   8/18:pt has s/s consistent with possible vestibular migraine component; will have neurology consultation at the end of the month; will cont with skilled PT for VRT until MD consultation    Treatment/Activity Tolerance:  [x] Patient tolerated treatment well [] Patient limited by fatique  [] Patient limited by pain  [] Patient limited by other medical complications  [] Other:     Overall Progression Towards Functional goals/ Treatment Progress Update:  [] Patient is progressing as expected towards functional goals listed. [] Progression is slowed due to complexities/Impairments listed. [] Progression has been slowed due to co-morbidities. [x] Plan just implemented, too soon to assess goals progression <30days   [] Goals require adjustment due to lack of progress  [] Patient is not progressing as expected and requires additional follow up with physician  [] Other    Prognosis for POC: [x] Good [] Fair  [] Poor    Patient requires continued skilled intervention: [x] Yes  [] No        PLAN: Progress with VRT for possible UVL but assess after neurology consult  [x] Continue per plan of care [] Alter current plan (see comments)  [] Plan of care initiated [] Hold pending MD visit [] Discharge    Electronically signed by: Trell Tate, PTMPT [id number]    Note: If patient does not return for scheduled/recommended follow up visits, this note will serve as a discharge from care along with the most recent update on progress.

## 2022-08-23 ENCOUNTER — HOSPITAL ENCOUNTER (OUTPATIENT)
Dept: PHYSICAL THERAPY | Age: 52
Setting detail: THERAPIES SERIES
Discharge: HOME | End: 2022-08-23
Payer: COMMERCIAL

## 2022-08-23 PROCEDURE — 97112 NEUROMUSCULAR REEDUCATION: CPT

## 2022-08-23 PROCEDURE — 97530 THERAPEUTIC ACTIVITIES: CPT

## 2022-08-23 NOTE — FLOWSHEET NOTE
[place] [facility], [facility]  [address] [facility] [place], [facility]  Phone: [phone]   Fax:     [phone]      Physical Therapy Treatment Note/ Progress Report:   Date:  2022    Patient Name:  David Gonzalez    :  1970  MRN: [id number]    Pertinent Medical History: Additional Pertinent Hx: anxiety    Medical/Treatment Diagnosis Information:  Diagnosis: R42 Dizzines  Treatment Diagnosis: dizziness limiting work and ADLs    Insurance/Certification information:  PT Insurance Information: Dwayne Gabriel - 30 visits per plan year ( 3/1-); no auth needed   Physician Information:  Referring Provider (secondary): Dr. Desiree Campuzano of care signed (Y/N): []  Yes [x]  No     Date of Patient follow up with Physician:      Progress Report: []  Yes  [x]  No     Date Range for reporting period:  Beginnin2022  Ending:     Progress report due (10 Rx/or 30 days whichever is less):  00    Recertification due (POC duration/ or 90 days whichever is less):      Visit # Insurance Allowable Auth required? Date Range    30 v per plan yr 3/1- []  Yes [x]  No      Latex Allergy:  [x]NO      []YES  Preferred Language for Healthcare:   [x]English       []other:    Functional Outcomes Measure:   Date Assessed:  Test:FOTO  Score:52          Pain level: 1-2/10 HA or neck pain today       Dizziness level: 3/10    History of Injury:Patient stated complaint: Pt notes h/o allergy and sinus trouble with frequent ear infections on L side. Pt notes onset of fullness/clogging in R ear back in May with onset of \"dizziness\" that feels like off balance, nauseous with movement. (Bending up/down, bed mobility, riding in car). Pt saw ENT with negative testing for [facility]. MRI done and ENT is referring pt to see neurologist for second opinion ( r/o demyelinating lesion?) and to rule out vestibular migraine component.        Pt has lost 20 pounds since May and is not intentionally trying to loose weight. Pt notes unable to eat as much as she could. Saw GI doctor (has ulcer and reflux).     SUBJECTIVE:  See eval  8/16: feels week 1 of vestibular protocol is helpful b/c she is not getting as dizzy in certain positions, but feels dizzy today; to see neurologist on 8/30 and then will f/u with ENT after   8/18: felt \"drained\" after last session and needed to go home to nap, but felt ok when she woke up 2 hours later; pt notes the HA that she came in with on 8/16 was the end of a migraine she had all day Monday; today feeling ok   8/23: napped after last session; a little headache and dizziness today, still has good and bad days, but overall feels the dizziness is less frequent; pt looked at her MRI report in [employer] and concerned with the results, pt reminded that the neurology consult on 8/30 will answer all questions and go over MRI results; pt will also f/u with ENT on 8/31: send PN to Dr. Gretchen Godfrey next session     OBJECTIVE:   Oculomotor/Vestibular Examination:     Spontaneous nystagmus:       [] Left             [] Right           [x] Absent  Gaze-Evoked nystagmus with fixation present:              Primary            [] Present       [x] Absent              Right                [] Present       [x] Absent              Left                  [] Present       [x] Absent  VOR Head Thrust Test:          [x] Normal       [] Abnormal    Comments: difficulty relaxing for test, pt jerked head every time rotational thrust was attempted   VOR Cancellation:                  [] Normal       [x] Abnormal    Comments: mild corrections   Smooth Pursuit:                      [x] Normal       [] Abnormal    Comments:  Saccades:                               [x] Normal       [] Abnormal    Comments: very mild jerkiness R eye   Convergence:                          [x] Normal       [] Abnormal    Comments:  Static Visual Acuity:                  Dynamic Visual Acuity:        Vertebral artery testing - seated only mild L neck pain         Positional Testing  R Hallpike-Keene maneuver:              Nystagmus:     [] Yes             [x] No               [] Duration:    Pressure in R ear and head that persisted, but no dizziness, nystagmus or obvious start/stop of symptoms                                      [] Direction:                  Vertigo:            [] Yes             [x] No               [] Duration:  L Hallpike-Ricky maneuver:              Nystagmus:     [] Yes             [x] No               [] Duration:                                          [] Direction:                  Vertigo:            [] Yes             [x] No               [] Duration:  Supine roll head right:              Nystagmus:     [] Yes             [x] No               [] Duration:                                          [] Direction:                  Vertigo:            [] Yes             [x] No               [] Duration:   Supine roll head left:              Nystagmus:     [] Yes             [x] No               [] Duration:                                          [] Direction:    []              Vertigo:            [] Yes              No                   [] Duration:     THOM testin N, 2 N, 3 N, 4 S, 5 S, 6 F, 7 NT - indicating possible UVL      RESTRICTIONS/PRECAUTIONS:     Exercises/Interventions:     Therapeutic Exercises (90270) Min: Resistance/Reps Notes/Cues                            Therapeutic Activities (46595) Min:8    Tandem balance X 30 sec L/R    SLS X 30 sec L/R                   Neuromuscular Re-ed (12185) Min:32     Positional testing NT         Weekly vestibular protocol    Week 3 reviewed with practicing imaginary target Cues needed             Airex:  Normal stance EC  NBOS EO  EC marching  NBOS w/ head turns  NBOS w/ head nods  NBOS eyes follow ball   X 30 sec  EO as needed  X 30 sec   X 20 sec    X 10  X 10   X 20 sec     NBOS:  EC  Toss ball b/w hands and follow w/ eye   X 30 sec    X 10         Standing x 1 viewing  Normal stance x 1 min up/down x 1 min s/s Cues for tech    Rocker board w/ focus on hook  S/s x 30 sec   F/b x 30 sec     Amb   w/ head turns  W/ head nods  W/ EC  W/ ball toss to self   X 2 lengths  X 2 lengths  X 2 lengths   X 2 lengths     Fwd bending circles with small ball while tracking ball w/ eye  X 5 small/x 5 with fwd bend         Habituation:  Seated head turns  Seated head nods  Seated fwd bend   2 X 10   2 x 10   2 X 5      Dizziness lasting 5 sec    Dizziness lasting 3-5 sec   Dizziness lasting 3 - 5 sec    Sit stand EC 2 x 5  Weakness in LE noted and mild dizziness lasting 5 sec    Yulissa Betsy No dizziness      8/23: SBA-CGA with all balance ex   Mild inc in dizziness after ll bar ex, but HA s/s dec and dizziness back to 3/10 after session      Manual Intervention ([phone]) Min:                Modalities  Min:                      Other Therapeutic Activities: Pt was educated on PT POC, Diagnosis, Prognosis, pathomechanics as well as frequency and duration of scheduling future physical therapy appointments. Time was also taken on this day to answer all patient questions and participation in PT. Reviewed appointment policy in detail with patient and patient verbalized understanding. X 10 min    Home Exercise Program: Patient was instructed in the following for HEP:     . Patient verbalized/demonstrated understanding and was issued written handout.   8/4: week 1 vest protocol   8/16: week 2, habituation head nods/turns/fwd bend     Therapeutic Exercise and NMR EXR  [] (13504) Provided verbal/tactile cueing for activities related to strengthening, flexibility, endurance, ROM for improvements in LE, proximal hip, and core control with self care, mobility, lifting, ambulation.  [] (31780) Provided verbal/tactile cueing for activities related to improving balance, coordination, kinesthetic sense, posture, motor skill, proprioception  to assist with LE, proximal hip, and core control in self care, mobility, lifting, ambulation and eccentric single leg control. [address] and Therapeutic Activities:    [x] (10672 or 00358) Provided verbal/tactile cueing for activities related to improving balance, coordination, kinesthetic sense, posture, motor skill, proprioception and motor activation to allow for proper function of core, proximal hip and LE with self care and ADLs and functional mobility.   [] (05849) Gait Re-education- Provided training and instruction to the patient for proper LE, core and proximal hip recruitment and positioning and eccentric body weight control with ambulation re-education including up and down stairs     Home Exercise Program:    [x] (82468) Reviewed/Progressed HEP activities related to strengthening, flexibility, endurance, ROM of core, proximal hip and LE for functional self-care, mobility, lifting and ambulation/stair navigation   [] (06233)Reviewed/Progressed HEP activities related to improving balance, coordination, kinesthetic sense, posture, motor skill, proprioception of core, proximal hip and LE for self care, mobility, lifting, and ambulation/stair navigation      Manual Treatments:  PROM / STM / Oscillations-Mobs:  G-I, II, III, IV (PA's, Inf., Post.)  [] (77958) Provided manual therapy to mobilize LE, proximal hip and/or LS spine soft tissue/joints for the purpose of modulating pain, promoting relaxation,  increasing ROM, reducing/eliminating soft tissue swelling/inflammation/restriction, improving soft tissue extensibility and allowing for proper ROM for normal function with self care, mobility, lifting and ambulation.        Charges:  Timed Code Treatment Minutes: 40   Total Treatment Minutes: 40      [] EVAL (LOW) 07899 (typically 20 minutes face-to-face)  [] EVAL (MOD) 78213 (typically 30 minutes face-to-face)  [] EVAL (HIGH) 31100 (typically 45 minutes face-to-face)  [] RE-EVAL     [] PV(25176) x     [] Dry needle 1 or 2 Muscles (04794)  [x] NMR (87405) x  2   [] Dry needle 3+ Muscles (74489)  [] Manual (80731) x     [] Ultrasound (44094) x  [x] TA (11884) x     [] Mech Traction (47827)  [] ES(attended) (01544)     [] ES (un) (20381):   [] Vasopump (20949) [] Ionto (29494)   [] Other:    GOALS:  Patient stated goal: \"stop feeling dizzy\"  [x] Progressing: [] Met: [] Not Met: [] Adjusted     Therapist goals for Patient:  Short Term Goals: To be achieved in: 2 weeks  1. Independent in HEP and progression per patient tolerance, in order to prevent re-injury. [x] Progressing: [] Met: [] Not Met: [] Adjusted  2. Patient will have a decrease in dizziness/imbalance/symptoms by 20-30% to facilitate improvement in movement, function, balance and ADLs as indicated by Functional Deficits. [x] Progressing: [] Met: [] Not Met: [] Adjusted     Long Term Goals: To be achieved in:at d/c  1. Increase FOTO functional outcome score from 52 to 58 to assist with reaching prior level of function. [x] Progressing: [] Met: [] Not Met: [] Adjusted  2. Patient will have a decrease in dizziness/imbalance/symptoms by 50-60% to facilitate improvement in movement, function, balance and ADLs as indicated by Functional Deficits. [x] Progressing: [] Met: [] Not Met: [] Adjusted  3. Patient will demonstrate negative oculomotor special testing/positional testing as indicated by patients Functional Deficits. [x] Progressing: [] Met: [] Not Met: [] Adjusted  4. Patient will return to functional activities including self care and light housework without increased symptoms or restriction. [x] Progressing: [] Met: [] Not Met: [] Adjusted  5.  \"Return to work\"(patient specific functional goal)    [x] Progressing: [] Met: [] Not Met: [] Adjusted            ASSESSMENT:  pt presents with possible UVL and we will proceed with VRT; pt also has questionable MRI findings per Dr. Yesi Damico and w/ her h/o migraines she is having neurology consult per MD advice   8/16: reduction of HA and stable dizzy s/s after session; pt notes some dec in dizziness with consistency of week 1 protocol; cont for VRT   8/18:pt has s/s consistent with possible vestibular migraine component; will have neurology consultation at the end of the month; will cont with skilled PT for VRT until MD consultation  8/23: mild inc in s/s during session that resolved after sitting for a few seconds; pt demonstrating improvement with recovery times after habituation ex and charo more VRt using Airex    Treatment/Activity Tolerance:  [x] Patient tolerated treatment well [] Patient limited by fatique  [] Patient limited by pain  [] Patient limited by other medical complications  [] Other:     Overall Progression Towards Functional goals/ Treatment Progress Update:  [] Patient is progressing as expected towards functional goals listed. [] Progression is slowed due to complexities/Impairments listed. [] Progression has been slowed due to co-morbidities. [x] Plan just implemented, too soon to assess goals progression <30days   [] Goals require adjustment due to lack of progress  [] Patient is not progressing as expected and requires additional follow up with physician  [] Other    Prognosis for POC: [x] Good [] Fair  [] Poor    Patient requires continued skilled intervention: [x] Yes  [] No        PLAN: Progress with VRT for possible UVL but assess after neurology consult; send PN to MD  [x] Continue per plan of care [] Alter current plan (see comments)  [] Plan of care initiated [] Hold pending MD visit [] Discharge    Electronically signed by: LAUREN Live [id number]    Note: If patient does not return for scheduled/recommended follow up visits, this note will serve as a discharge from care along with the most recent update on progress.

## 2022-08-25 ENCOUNTER — HOSPITAL ENCOUNTER (OUTPATIENT)
Dept: PHYSICAL THERAPY | Age: 52
Setting detail: THERAPIES SERIES
Discharge: HOME | End: 2022-08-25
Payer: COMMERCIAL

## 2022-08-25 PROCEDURE — 97112 NEUROMUSCULAR REEDUCATION: CPT

## 2022-08-25 PROCEDURE — 97530 THERAPEUTIC ACTIVITIES: CPT

## 2022-08-25 NOTE — FLOWSHEET NOTE
[facility], [facility]  [address] [facility]  Phone: [phone]   Fax:     [phone]        Physical Therapy Re-Certification Plan of [address]      Dear Dr. Anaid Guillen ,    We had the pleasure of treating the following patient for physical therapy services at [address]. A summary of our findings can be found in the updated assessment below. This includes our plan of care. If you have any questions or concerns regarding these findings, please do not hesitate to contact me at the office phone number checked above.   Thank you for the referral.     Physician Signature:________________________________Date:__________________  By signing above (or electronic signature), therapists plan is approved by physician    Date Range Of Visits: 8/4/22-8/25/22  Total Visits to Date: 5  Overall Response to Treatment:   []Patient is responding well to treatment and improvement is noted with regards  to goals   []Patient should continue to improve in reasonable time if they continue HEP   []Patient has plateaued and is no longer responding to skilled PT intervention    []Patient is getting worse and would benefit from return to referring MD   []Patient unable to adhere to initial POC   [x]Other: update on therapy as pt has neurology consultation and ENT consultation next week   Updated 8/25:  *intermittent feelings of dizziness and fogginess and imbalance that has good and bad days but when present, it's constant  *50% of the time her dizziness is accompanied with a HA  *PT is helping somewhat b/c her focus on objects is better, but overall she feels her balance is not any better and about the same  *improvement is noted by PT during session in both balance ex and recovery time when dizziness is provoked   *pending neuro consult for possible vestibular migraine component and MRI assessment       Recommendation:    [x]Continue PT 2x / wk for 4 weeks to progress with VRT pending MD and neuro consultation. []Hold PT, pending MD visit    Physical Therapy Treatment Note/ Progress Report:   Date:  2022    Patient Name:  Shandra Drew    :  1970  MRN: [id number]    Pertinent Medical History: Additional Pertinent Hx: anxiety    Medical/Treatment Diagnosis Information:  Diagnosis: R42 Dizzines  Treatment Diagnosis: dizziness limiting work and ADLs    Insurance/Certification information:  PT Insurance Information: Lanie Sinha - 30 visits per plan year ( 3/1-); no auth needed   Physician Information:  Referring Provider (secondary): Dr. Huy Harrison of care signed (Y/N): []  Yes [x]  No     Date of Patient follow up with Physician:      Progress Report: []  Yes  [x]  No     Date Range for reporting period:  Beginnin2022  Ending:     Progress report due (10 Rx/or 30 days whichever is less):  64    Recertification due (POC duration/ or 90 days whichever is less):      Visit # Insurance Allowable Auth required? Date Range    30 v per plan yr 3/1- []  Yes [x]  No      Latex Allergy:  [x]NO      []YES  Preferred Language for Healthcare:   [x]English       []other:    Functional Outcomes Measure:   Date Assessed:  Test:FOTO  Score:52  Update : 53          Pain level: 0/10 HA or neck pain today       Dizziness level: 1-2/10 \"fogginess\"    History of Injury:Patient stated complaint: Pt notes h/o allergy and sinus trouble with frequent ear infections on L side. Pt notes onset of fullness/clogging in R ear back in May with onset of \"dizziness\" that feels like off balance, nauseous with movement. (Bending up/down, bed mobility, riding in car). Pt saw ENT with negative testing for [facility]. MRI done and ENT is referring pt to see neurologist for second opinion ( r/o demyelinating lesion?) and to rule out vestibular migraine component.        Pt has lost 20 pounds since May and is not intentionally trying to loose weight. Pt notes unable to eat as much as she could. Saw GI doctor (has ulcer and reflux).     SUBJECTIVE:  See eval  8/16: feels week 1 of vestibular protocol is helpful b/c she is not getting as dizzy in certain positions, but feels dizzy today; to see neurologist on 8/30 and then will f/u with ENT after   8/18: felt \"drained\" after last session and needed to go home to nap, but felt ok when she woke up 2 hours later; pt notes the HA that she came in with on 8/16 was the end of a migraine she had all day Monday; today feeling ok   8/23: napped after last session; a little headache and dizziness today, still has good and bad days, but overall feels the dizziness is less frequent; pt looked at her MRI report in [address] [place] [address] and concerned with the results, pt reminded that the neurology consult on 8/30 will answer all questions and go over MRI results; pt will also f/u with ENT on 8/31: send PN to Dr. Russel Arthur next session   8/25: feeling anxious today (work and school issues with her daughter) and \"foggy\" in her head; frustrated with how she is performing; MCKOY yesterday with slight increased imbalance; see progress note below    OBJECTIVE:   Oculomotor/Vestibular Examination:     Spontaneous nystagmus:       [] Left             [] Right           [x] Absent  Gaze-Evoked nystagmus with fixation present:              Primary            [] Present       [x] Absent              Right                [] Present       [x] Absent              Left                  [] Present       [x] Absent  VOR Head Thrust Test:          [x] Normal       [] Abnormal    Comments: difficulty relaxing for test, pt jerked head every time rotational thrust was attempted   VOR Cancellation:                  [] Normal       [x] Abnormal    Comments: mild corrections   Smooth Pursuit:                      [x] Normal       [] Abnormal    Comments:  Saccades:                               [x] Normal       [] Abnormal    Comments: very mild jerkiness R eye   Convergence:                          [x] Normal       [] Abnormal    Comments:  Static Visual Acuity:                  Dynamic Visual Acuity:        Vertebral artery testing - seated only mild L neck pain         Positional Testing  R Hallpike-[place] maneuver:              Nystagmus:     [] Yes             [x] No               [] Duration:    Pressure in R ear and head that persisted, but no dizziness, nystagmus or obvious start/stop of symptoms                                      [] Direction:                  Vertigo:            [] Yes             [x] No               [] Duration:  L Hallpike-[place] maneuver:              Nystagmus:     [] Yes             [x] No               [] Duration:                                          [] Direction:                  Vertigo:            [] Yes             [x] No               [] Duration:  Supine roll head right:              Nystagmus:     [] Yes             [x] No               [] Duration:                                          [] Direction:                  Vertigo:            [] Yes             [x] No               [] Duration:   Supine roll head left:              Nystagmus:     [] Yes             [x] No               [] Duration:                                          [] Direction:    []              Vertigo:            [] Yes              No                   [] Duration:     THOM testin N, 2 N, 3 N, 4 S, 5 S, 6 F, 7 NT - indicating possible UVL      RESTRICTIONS/PRECAUTIONS:     Exercises/Interventions:     Therapeutic Exercises (77461) Min: Resistance/Reps Notes/Cues                            Therapeutic Activities (90825) Min:18 See below and foto update    Tandem balance X 30 sec L/R    SLS X 30 sec L/R                   Neuromuscular Re-ed (85690) Min:32     Positional testing NT         Weekly vestibular protocol    Week 3 reviewed with practicing imaginary target Cues needed             Airex:  Normal stance EC  NBOS EO  EC marching  NBOS w/ head turns  NBOS w/ head nods  NBOS eyes follow ball   X 30 sec  EO as needed  X 30 sec   X 20 sec    X 10  X 10   X 20 sec     NBOS:  EC  Toss ball b/w hands and follow w/ eye   X 30 sec    X 10         Standing x 1 viewing  NBOS x 1 min up/down x 1 min s/s Improved tech with speed   Rocker board w/ focus on hook  S/s x 30 sec   F/b x 30 sec     Amb   w/ head turns  W/ head nods  W/ EC  W/ ball toss to self  Line walk   X 2 lengths  X 2 lengths  X 2 lengths   X 2 lengths   X 2 lengths    Fwd bending circles with small ball while tracking ball w/ eye  2 x 5 with fwd bend         Habituation:  Seated head turns  Seated head nods  Seated fwd bend   2 X 10   2 x 10   2 X 5      Dizziness lasting 4-5 sec    Slight inc in dizziness lasting 5-8 sec   Dizziness lasting 3-5 sec    Sit stand EC X 10  Weakness in LE noted and mild dizziness lasting 5 sec    Alfredo Lo No dizziness      8/23: SBA-CGA with all balance ex   Mild inc in dizziness with some ex today that resolved after a few seconds of stopping    Manual Intervention (78042) Min:                Modalities  Min:                      Other Therapeutic Activities: Pt was educated on PT POC, Diagnosis, Prognosis, pathomechanics as well as frequency and duration of scheduling future physical therapy appointments. Time was also taken on this day to answer all patient questions and participation in PT. Reviewed appointment policy in detail with patient and patient verbalized understanding. X 10 min    Home Exercise Program: Patient was instructed in the following for HEP:     . Patient verbalized/demonstrated understanding and was issued written handout.   8/4: week 1 vest protocol   8/16: week 2, habituation head nods/turns/fwd bend     Therapeutic Exercise and NMR EXR  [] (72045) Provided verbal/tactile cueing for activities related to strengthening, flexibility, endurance, ROM for improvements in LE, proximal hip, and core control with self care, mobility, lifting, ambulation.  [] (00526) Provided verbal/tactile cueing for activities related to improving balance, coordination, kinesthetic sense, posture, motor skill, proprioception  to assist with LE, proximal hip, and core control in self care, mobility, lifting, ambulation and eccentric single leg control. [address] and Therapeutic Activities:    [x] (28758 or 64965) Provided verbal/tactile cueing for activities related to improving balance, coordination, kinesthetic sense, posture, motor skill, proprioception and motor activation to allow for proper function of core, proximal hip and LE with self care and ADLs and functional mobility.   [] (86777) Gait Re-education- Provided training and instruction to the patient for proper LE, core and proximal hip recruitment and positioning and eccentric body weight control with ambulation re-education including up and down stairs     Home Exercise Program:    [x] (08630) Reviewed/Progressed HEP activities related to strengthening, flexibility, endurance, ROM of core, proximal hip and LE for functional self-care, mobility, lifting and ambulation/stair navigation   [] (44932)Reviewed/Progressed HEP activities related to improving balance, coordination, kinesthetic sense, posture, motor skill, proprioception of core, proximal hip and LE for self care, mobility, lifting, and ambulation/stair navigation      Manual Treatments:  PROM / STM / Oscillations-Mobs:  G-I, II, III, IV (PA's, Inf., Post.)  [] (30897) Provided manual therapy to mobilize LE, proximal hip and/or LS spine soft tissue/joints for the purpose of modulating pain, promoting relaxation,  increasing ROM, reducing/eliminating soft tissue swelling/inflammation/restriction, improving soft tissue extensibility and allowing for proper ROM for normal function with self care, mobility, lifting and ambulation.        Charges:  Timed Code Treatment Minutes: 50   Total Treatment Minutes: 50 [] EVAL (LOW) 27220 (typically 20 minutes face-to-face)  [] EVAL (MOD) 50875 (typically 30 minutes face-to-face)  [] EVAL (HIGH) 40046 (typically 45 minutes face-to-face)  [] RE-EVAL     [] ZF(71750) x     [] Dry needle 1 or 2 Muscles (34524)  [x] NMR (09601) x  2   [] Dry needle 3+ Muscles (20855)  [] Manual (11885) x     [] Ultrasound (68701) x  [x] TA (41097) x     [] Mech Traction (57685)  [] ES(attended) (27276)     [] ES (un) (43050):   [] Vasopump (32119) [] Ionto (23581)   [] Other:    GOALS:  Updated 8/25:  *intermittent feelings of dizziness and fogginess and imbalance that has good and bad days but when present, it's constant  *50% of the time her dizziness is accompanied with a HA  *PT is helping b/c her focus on objects is better, but overall she feels her balance is not any better and about the same  *improvement is noted by PT during session in both balance ex and recovery time when dizziness is provoked     Patient stated goal: \"stop feeling dizzy\"  [x] Progressing: [] Met: [] Not Met: [] Adjusted     Therapist goals for Patient:  Short Term Goals: To be achieved in: 2 weeks  1. Independent in HEP and progression per patient tolerance, in order to prevent re-injury. [x] Progressing: [] Met: [] Not Met: [] Adjusted  2. Patient will have a decrease in dizziness/imbalance/symptoms by 20-30% to facilitate improvement in movement, function, balance and ADLs as indicated by Functional Deficits. [x] Progressing: [] Met: [] Not Met: [] Adjusted     Long Term Goals: To be achieved in:at d/c  1. Increase FOTO functional outcome score from 52 to 58 to assist with reaching prior level of function. [x] Progressing: [] Met: [] Not Met: [] Adjusted  2. Patient will have a decrease in dizziness/imbalance/symptoms by 50-60% to facilitate improvement in movement, function, balance and ADLs as indicated by Functional Deficits. [x] Progressing: [] Met: [] Not Met: [] Adjusted  3.  Patient will demonstrate negative oculomotor special testing/positional testing as indicated by patients Functional Deficits. [x] Progressing: [] Met: [] Not Met: [] Adjusted  4. Patient will return to functional activities including self care and light housework without increased symptoms or restriction. [x] Progressing: [] Met: [] Not Met: [] Adjusted  5. \"Return to work\"(patient specific functional goal)    [x] Progressing: [] Met: [] Not Met: [] Adjusted            ASSESSMENT:  pt presents with possible UVL and we will proceed with VRT; pt also has questionable MRI findings per Dr. Maria Esther Graves and w/ her h/o migraines she is having neurology consult per MD advice   8/16: reduction of HA and stable dizzy s/s after session; pt notes some dec in dizziness with consistency of week 1 protocol; cont for VRT   8/18:pt has s/s consistent with possible vestibular migraine component; will have neurology consultation at the end of the month; will cont with skilled PT for VRT until MD consultation  8/23: mild inc in s/s during session that resolved after sitting for a few seconds; pt demonstrating improvement with recovery times after habituation ex and charo more VRt using Airex    Treatment/Activity Tolerance:  [x] Patient tolerated treatment well [] Patient limited by fatique  [] Patient limited by pain  [] Patient limited by other medical complications  [] Other:     Overall Progression Towards Functional goals/ Treatment Progress Update:  [] Patient is progressing as expected towards functional goals listed. [] Progression is slowed due to complexities/Impairments listed. [] Progression has been slowed due to co-morbidities.   [x] Plan just implemented, too soon to assess goals progression <30days   [] Goals require adjustment due to lack of progress  [] Patient is not progressing as expected and requires additional follow up with physician  [] Other    Prognosis for POC: [x] Good [] Fair  [] Poor    Patient requires continued skilled intervention: [x] Yes  [] No        PLAN: Progress with VRT for possible UVL but assess after neurology consult  [x] Continue per plan of care [] Alter current plan (see comments)  [] Plan of care initiated [] Hold pending MD visit [] Discharge    Electronically signed by: LAUREN Webb [id number]    Note: If patient does not return for scheduled/recommended follow up visits, this note will serve as a discharge from care along with the most recent update on progress.

## 2022-08-30 ENCOUNTER — APPOINTMENT (OUTPATIENT)
Dept: PHYSICAL THERAPY | Age: 52
End: 2022-08-30
Payer: COMMERCIAL

## 2022-08-31 ENCOUNTER — OFFICE VISIT (OUTPATIENT)
Dept: ENT CLINIC | Age: 52
End: 2022-08-31
Payer: COMMERCIAL

## 2022-08-31 VITALS
SYSTOLIC BLOOD PRESSURE: 119 MMHG | BODY MASS INDEX: 17.84 KG/M2 | HEART RATE: 105 BPM | HEIGHT: 66 IN | RESPIRATION RATE: 16 BRPM | WEIGHT: 111 LBS | DIASTOLIC BLOOD PRESSURE: 79 MMHG

## 2022-08-31 DIAGNOSIS — R09.81 NASAL CONGESTION: ICD-10-CM

## 2022-08-31 DIAGNOSIS — K13.79 LESION OF HARD PALATE: ICD-10-CM

## 2022-08-31 DIAGNOSIS — R42 DIZZINESS: Primary | ICD-10-CM

## 2022-08-31 DIAGNOSIS — R42 VERTIGO: ICD-10-CM

## 2022-08-31 DIAGNOSIS — J34.89 NASAL OBSTRUCTION: ICD-10-CM

## 2022-08-31 DIAGNOSIS — K14.8 TONGUE LESION: ICD-10-CM

## 2022-08-31 PROCEDURE — 99213 OFFICE O/P EST LOW 20 MIN: CPT | Performed by: STUDENT IN AN ORGANIZED HEALTH CARE EDUCATION/TRAINING PROGRAM

## 2022-08-31 NOTE — PROGRESS NOTES
falls since this occurred. She also notes frequent headaches. In the past, she has had issues related to multiple ear infections on the left side as well as an ear tube in the left ear. Patient denies otorrhea, syncope, fluctuating hearing, aural fullness. Patient also has issues related to multiple sinonasal complaints. Patient states that she gets intermittent facial pain and pressure which she describes as located in between her eyes and central aspect of her forehead. She gets nasal obstruction which alternates from side to side but is persistent. She gets purulent nasal drainage every couple weeks. She recently trialed fluticasone. She is unclear if this is helped her symptoms. Her sense of smell is diminished. She also has a history of epistaxis on the left side. Patient notes that she has a history of heavy metal exposure through her work and [employer], particularly nickel. Update 7/27/2022:    Patient presents today for issues related to her ear and nose. She has persistent dizziness. This is impacting her ability to work. She tried to fluticasone but still gets facial pain and pressure. She has a lesion on her hard palate that she wants examined. She is still getting nasal drainage which is clear. She denies improvement with the Flonase. She denies purulent drainage. Update 8/3/2022:    Patient has issues related to increased difficulty with her speech and difficulty eating. She is unable to cook or drive for herself. She has trouble standing for extended periods of time. She has been persistently dizzy and lightheaded, no recent episodes of vertigo. She feels that some of her head pressure is better since starting medications represcribed, but overall still feeling pretty off.     Update 8/31/2022:    -Met with neurology, they felt that her symptoms were related to vestibular migraines and no evidence of demyelinating lesions.  -Symptoms have improved but she is still having episodes of dizziness and imbalance which is impacting her normal everyday activities.  -Sinonasal symptoms are stable. -Taking magnesium oxide and riboflavin  -Doing with physical therapy    REVIEW OF SYSTEMS  The following systems were reviewed and revealed the following in addition to any already discussed in the HPI:    PHYSICAL EXAM    GENERAL: No acute distress, alert and oriented, improvement of her phonation with no further wet garbled speech  EYES: EOMI, Anti-icteric  HENT:     Difficulty focusing at times during the exam and difficulty in expression. 2 mm flat white lesion of the anterolateral left tongue, stable  There is evidence of slight mucosal irregularity at the juncture of the denture plate in the hard palate. No evidence of underlying mass lesion has are stable        PROCEDURE  Nasal Endoscopy (CPT code 71955)-prior visit       Due to the patients chronic sinus disease and/or history of sinonasal neoplasm for surveillance a nasal endoscopy with or without debridement will be performed to complete a significant physical examination of the patient which cannot be performed by anterior rhinoscopy alone (failure of complete examination of the paranasal sinuses). Failure to provide this procedure may lead to late detection of significant chronic benign disease, acute exacerbation, resolution or failure of early diagnosis of recurrent cancer. The procedure report is present in the body of the chart. Verbal consent was received. After topical anesthesia and decongestion had been obtained using aerosolized 1% lidocaine and oxymetazoline, a 45 degree rigid endoscope was placed into both nares with the patient in a sitting position. The following was observed:        Septum: intact and deviated   Other:   -The inferior and middle turbinates were examined.   The middle meatus, and sphenoethmoid recess was examined bilaterally.    -There is evidence of inferior turbinate hypertrophy and clear secretions. There is erythematous mucosa. There is some fullness of the nasopharynx which appears to be persistent adenoid tissue. No evidence of ulceration or bleeding. This is stable from last time.  -There were no complications. Tolerated well without complication. I attest that I was present for and did the entire procedure myself. This note was generated completely or in part utilizing Dragon dictation speech recognition software. Occasionally, words are mistranscribed and despite editing, the text may contain inaccuracies due to incorrect word recognition. If further clarification is needed please contact the office at [phone]. An electronic signature was used to authenticate this note.     --Ly So MD

## 2022-09-01 ENCOUNTER — HOSPITAL ENCOUNTER (OUTPATIENT)
Dept: PHYSICAL THERAPY | Age: 52
Setting detail: THERAPIES SERIES
Discharge: HOME | End: 2022-09-01

## 2022-09-01 NOTE — FLOWSHEET NOTE
[facility], [facility]  [address] [facility]  Phone: [phone]   Fax:     [phone]    Physical Therapy  Cancellation/No-show Note  Patient Name:  Milli Ochoa  :  1970   Date:  2022  Cancelled visits to date: 0  No-shows to date: 1    Patient status for today's appointment patient:  []  Cancelled  []  Rescheduled appointment  [x]  No-show     Reason given by patient:  []  Patient ill  []  Conflicting appointment  []  No transportation    []  Conflict with work  []  No reason given  [x]  Other:  spoke with pt, missed today's session b/c she had to travel to [place] to  her daughter; pt reports that neurologist feels her dizziness is coming from vestibular migraines and would like her to hold PT at this time to try new medication to help control her migraines; will d/c at this time    Comments:      Phone call information:   [x]  Phone call made today to patient at _ time at number provided:      [x]  Patient answered, conversation as follows: see above    []  Patient did not answer, message left as follows:  []  Phone call not made today    Electronically signed by:  Jb Madden, PTMPT [id number]

## 2022-09-01 NOTE — PLAN OF CARE
[place] [facility], [facility]  [address] [facility], [facility]  Phone: [phone]   Fax:     [phone]      Physical Therapy Discharge Summary    Dear Sho Piedra MD,    We had the pleasure of treating the following patient for physical therapy services at [address]. A summary of our findings can be found in the discharge summary below. This includes our final assessment. If you have any questions or concerns regarding these findings, please do not hesitate to contact me at the office phone number checked above. Thank you for the referral.       Physician Signature:________________________________Date:__________________  By signing above, therapists plan is approved by physician          Patient: Mitul Macias   : 1970   MRN: [id number]  Referring Physician:        Evaluation Date: 2022        Medical/Treatment Diagnosis Information:  Diagnosis: R42 Dizzines  Treatment Diagnosis: dizziness limiting work and ADLs     Insurance/Certification information:  [address]     Date Range of Treatment: 22-22   Total visits:5      Functional Outcomes Measure: at discharge  Test:FOTO  Score:53      ASSESSMENT: Per pt's follow up after neurology consult, her dizziness is likely caused by vestibular migraines. Will d/c PT at this time to try managing migraines with new meds as prescribed by neurologist and will assess.        Response to Treatment:   []Patient met all goals and has responded well to treatment and will continue HEP   []Patient should continue to improve in reasonable time if they continue HEP   [x]Patient has plateaued and is no longer responding to skilled PT intervention - trial management of possible vestibular migraines via medication    []Patient is getting worse and would benefit from return to referring MD   []Patient unable to adhere to initial POC      GOALS: partially met        Reason for Discharge:  [] Goals Met   [] Patient did not return after initial evaluation   [] Progress Plateaued  [] Missed _____ scheduled appointments   [] No insurance coverage [] Patient terminated therapy   [] MD discharged from PT [] Financial Limitations  [x] Other:trial management of possible vestibular migraines via medication per neurologist        Electronically signed by:  Bharath Rodriguez, PT,MPT [id number]

## 2023-03-06 ENCOUNTER — APPOINTMENT (OUTPATIENT)
Dept: GENERAL RADIOLOGY | Age: 53
End: 2023-03-06

## 2023-03-06 ENCOUNTER — HOSPITAL ENCOUNTER (EMERGENCY)
Age: 53
Discharge: HOME | End: 2023-03-06
Attending: EMERGENCY MEDICINE
Payer: COMMERCIAL

## 2023-03-06 VITALS
HEIGHT: 66 IN | WEIGHT: 99.21 LBS | OXYGEN SATURATION: 98 % | TEMPERATURE: 97.6 F | DIASTOLIC BLOOD PRESSURE: 42 MMHG | HEART RATE: 82 BPM | RESPIRATION RATE: 16 BRPM | BODY MASS INDEX: 15.94 KG/M2 | SYSTOLIC BLOOD PRESSURE: 109 MMHG

## 2023-03-06 DIAGNOSIS — R07.89 CHEST WALL PAIN: Primary | ICD-10-CM

## 2023-03-06 LAB
ANION GAP SERPL CALCULATED.3IONS-SCNC: 6 MMOL/L (ref 3–16)
BASOPHILS ABSOLUTE: 0 K/UL (ref 0–0.2)
BASOPHILS RELATIVE PERCENT: 0.5 %
BUN BLDV-MCNC: 5 MG/DL (ref 7–20)
CALCIUM SERPL-MCNC: 9.2 MG/DL (ref 8.3–10.6)
CHLORIDE BLD-SCNC: 102 MMOL/L (ref 99–110)
CO2: 26 MMOL/L (ref 21–32)
CREAT SERPL-MCNC: 0.6 MG/DL (ref 0.6–1.1)
D DIMER: 0.36 UG/ML FEU (ref 0–0.6)
EKG ATRIAL RATE: 83 BPM
EKG DIAGNOSIS: NORMAL
EKG P AXIS: 77 DEGREES
EKG P-R INTERVAL: 118 MS
EKG Q-T INTERVAL: 388 MS
EKG QRS DURATION: 84 MS
EKG QTC CALCULATION (BAZETT): 455 MS
EKG R AXIS: 67 DEGREES
EKG T AXIS: 58 DEGREES
EKG VENTRICULAR RATE: 83 BPM
EOSINOPHILS ABSOLUTE: 0 K/UL (ref 0–0.6)
EOSINOPHILS RELATIVE PERCENT: 0.6 %
GFR SERPL CREATININE-BSD FRML MDRD: >60 ML/MIN/{1.73_M2}
GLUCOSE BLD-MCNC: 86 MG/DL (ref 70–99)
HCT VFR BLD CALC: 36.4 % (ref 36–48)
HEMOGLOBIN: 12.2 G/DL (ref 12–16)
LYMPHOCYTES ABSOLUTE: 2.7 K/UL (ref 1–5.1)
LYMPHOCYTES RELATIVE PERCENT: 43.6 %
MCH RBC QN AUTO: 32.3 PG (ref 26–34)
MCHC RBC AUTO-ENTMCNC: 33.6 G/DL (ref 31–36)
MCV RBC AUTO: 96.2 FL (ref 80–100)
MONOCYTES ABSOLUTE: 0.3 K/UL (ref 0–1.3)
MONOCYTES RELATIVE PERCENT: 5.6 %
NEUTROPHILS ABSOLUTE: 3.1 K/UL (ref 1.7–7.7)
NEUTROPHILS RELATIVE PERCENT: 49.7 %
PDW BLD-RTO: 12.7 % (ref 12.4–15.4)
PLATELET # BLD: 214 K/UL (ref 135–450)
PMV BLD AUTO: 8.5 FL (ref 5–10.5)
POTASSIUM SERPL-SCNC: 3.4 MMOL/L (ref 3.5–5.1)
RBC # BLD: 3.78 M/UL (ref 4–5.2)
SODIUM BLD-SCNC: 134 MMOL/L (ref 136–145)
TROPONIN: <0.01 NG/ML
WBC # BLD: 6.2 K/UL (ref 4–11)

## 2023-03-06 PROCEDURE — 99285 EMERGENCY DEPT VISIT HI MDM: CPT

## 2023-03-06 PROCEDURE — 71046 X-RAY EXAM CHEST 2 VIEWS: CPT

## 2023-03-06 PROCEDURE — 84484 ASSAY OF TROPONIN QUANT: CPT

## 2023-03-06 PROCEDURE — 93010 ELECTROCARDIOGRAM REPORT: CPT | Performed by: INTERNAL MEDICINE

## 2023-03-06 PROCEDURE — 85025 COMPLETE CBC W/AUTO DIFF WBC: CPT

## 2023-03-06 PROCEDURE — 93005 ELECTROCARDIOGRAM TRACING: CPT | Performed by: EMERGENCY MEDICINE

## 2023-03-06 PROCEDURE — 85379 FIBRIN DEGRADATION QUANT: CPT

## 2023-03-06 PROCEDURE — 80048 BASIC METABOLIC PNL TOTAL CA: CPT

## 2023-03-06 ASSESSMENT — PAIN DESCRIPTION - ORIENTATION: ORIENTATION: LEFT

## 2023-03-06 ASSESSMENT — PAIN DESCRIPTION - PAIN TYPE: TYPE: ACUTE PAIN

## 2023-03-06 ASSESSMENT — PAIN DESCRIPTION - LOCATION
LOCATION: CHEST
LOCATION: CHEST

## 2023-03-06 ASSESSMENT — PAIN SCALES - GENERAL
PAINLEVEL_OUTOF10: 4
PAINLEVEL_OUTOF10: 4

## 2023-03-06 ASSESSMENT — PAIN - FUNCTIONAL ASSESSMENT
PAIN_FUNCTIONAL_ASSESSMENT: 0-10
PAIN_FUNCTIONAL_ASSESSMENT: 0-10

## 2023-03-06 NOTE — ED PROVIDER NOTES
[address] [facility]  eMERGENCY dEPARTMENT eNCOUnter      Pt Name: Bailey Ramirez  MRN: [id number]  [place] 1970  Date of evaluation: 3/6/2023  Provider: Mana Arnold MD    [address] [place] [address]       Chief Complaint   Patient presents with    Chest Pain     Pt with chest pain directly on left breast that radiates into back/shoulder blade since Thursday. Pt states started tingling and then went to bed. Unable to take deep breath. States fell two weeks ago and hit left side of rib on washer. Denies cardiac hx. CRITICAL CARE TIME   Total Critical Care time was 0 minutes, excluding separately reportable procedures. There was a high probability of clinically significant/life threatening deterioration in the patient's condition which required my urgent intervention. HISTORY OF PRESENT ILLNESS  (Location/Symptom, Timing/Onset, Context/Setting, Quality, Duration, Modifying Factors, Severity.)   History From: Patient  Limitations to history : None    Bailey Ramirez is a 50 y.o. female who presents to the emergency department with chest pain. Her pain is sharp. It is in her left lateral chest in her anterior chest.  She actually injured herself 2 weeks ago when she fell and hit her ribs on the side of the washer. She has had some pain in her left ribs since then. Since Thursday she has had some sharp pain in her anterior chest that radiates through to her back. She states just prior to it starting she felt numb all over. This pain has been persistent. She has been taking Tylenol and ibuprofen for the pain at both sites. She is not short of breath. She has no arm or neck pain. No abdominal pain nausea or vomiting. No leg pain or leg swelling. Nursing Notes were reviewed and I agree.       SCREENINGS        Alma Coma Scale  Eye Opening: Spontaneous  Best Verbal Response: Oriented  Best Motor Response: Obeys commands  Chad Coma Scale Score: 15            [facility] Assessment  BP: (!) 109/42  Heart Rate: 82           REVIEW OF SYSTEMS    (2-9 systems for level 4, 10 or more for level 5)     HEENT: Negative.  Cardiovascular: Chest pain, anterior chest, sharp, left lateral chest, sharp, worse with movement and inspiration.  Pulmonary: No cough.  No shortness of breath.  GI: No abdominal pain, nausea, vomiting.  Neuro: No dizziness or passing out.  Musculoskeletal: No leg pain or leg swelling.    Except as noted above the remainder of the review of systems was reviewed and negative.       PAST MEDICAL HISTORY     Past Medical History:   Diagnosis Date    Anxiety     GERD (gastroesophageal reflux disease)     Kidney stones          SURGICAL HISTORY       Past Surgical History:   Procedure Laterality Date     SECTION      ENDOSCOPY, COLON, DIAGNOSTIC      TUBAL LIGATION      UPPER GASTROINTESTINAL ENDOSCOPY N/A 2022    EGD ESOPHAGOGASTRODUODENOSCOPY performed by Santos Bennett MD at [facility] ENDOSCOPY         CURRENT MEDICATIONS       Previous Medications    AZELASTINE (ASTELIN) 0.1 % NASAL SPRAY    1 spray by Nasal route in the morning and 1 spray before bedtime. Use in each nostril as directed.    DIAZEPAM (VALIUM) 5 MG TABLET    Take 5 mg by mouth every 12 hours as needed for Anxiety    FEXOFENADINE (ALLEGRA) 180 MG TABLET    Take 1 tablet by mouth in the morning.    HYOSCYAMINE (ANASPAZ;LEVSIN) 125 MCG TABLET        LORATADINE (CLARITIN) 10 MG TABLET    Take 10 mg by mouth daily    OMEPRAZOLE (PRILOSEC) 40 MG DELAYED RELEASE CAPSULE           ALLERGIES     Sulfa antibiotics    FAMILY HISTORY     History reviewed. No pertinent family history.       SOCIAL HISTORY       Social History     Socioeconomic History    Marital status:      Spouse name: None    Number of children: None    Years of education: None    Highest education level: None   Tobacco Use    Smoking status: Every Day     Packs/day: 1.00     Years: 30.00     Pack years: 30.00      Types: Cigarettes    Smokeless tobacco: Never   Substance and Sexual Activity    Alcohol use: Yes     Comment: rarely    Drug use: Yes     Types: Marijuana (Weed)     Comment: using to see if appetite increases         PHYSICAL EXAM    (up to 7 for level 4, 8 or more for level 5)     ED Triage Vitals [03/06/23 0933]   BP Temp Temp Source Heart Rate Resp SpO2 Height Weight   (!) 135/93 97.6 °F (36.4 °C) Oral 97 16 100 % 5' 6\" (1.676 m) 99 lb 3.3 oz (45 kg)       No: Alert thin white female no acute distress. HEENT: Atraumatic. Pupils equal round reactive. Extraocular movements are intact. Nose is clear. Oropharynx negative. Neck: Supple, nontender, no adenopathy. Heart: Regular rate and rhythm. No murmurs or gallops noted. Lungs: Breath sounds equal bilaterally and clear. Chest wall: Left anterior and lateral chest wall tenderness. Poorly localized. No crepitance. Abdomen: Scaphoid, soft, nontender. Musculoskeletal: No extremity edema. No calf tenderness. Intact symmetrical distal pulses. Skin: Warm and dry, good turgor. No pallor or cyanosis. No diaphoresis      DIFFERENTIAL DIAGNOSIS   Differential includes but is not limited to chest wall pain, GERD, esophageal spasm, angina, myocardial infarction, pulmonary embolism, aortic dissection, other      DIAGNOSTIC RESULTS     EKG: All EKG's are interpreted by Esthela Ernst MD in the absence of a cardiologist.    Sinus rhythm, rate of 83, normal EKG. Rhythm strip shows a sinus rhythm with a rate of 83, AL interval 118 ms, QRS of 84 ms with no other ectopy as interpreted by me. This compared to an EKG dated 6/2/2017, no significant changes noted.     RADIOLOGY:   Non-plain film images such as CT, Ultrasound and MRI are read by the radiologist. Plain radiographic images are visualized and preliminarily interpreted Esthela Ernst MD with the below findings:      Interpretation per the Radiologist below, if available at the time of this note:    XR CHEST (2 VW)   Final Result   No acute process. ED BEDSIDE ULTRASOUND:   Performed by ED Physician - none    LABS:  Labs Reviewed   BASIC METABOLIC PANEL - Abnormal; Notable for the following components:       Result Value    Sodium 134 (*)     Potassium 3.4 (*)     BUN 5 (*)     All other components within normal limits   CBC WITH AUTO DIFFERENTIAL - Abnormal; Notable for the following components:    RBC 3.78 (*)     All other components within normal limits   TROPONIN   D-DIMER, QUANTITATIVE       All other labs were within normal range or not returned as of this dictation. EMERGENCY DEPARTMENT COURSE and DIFFERENTIAL DIAGNOSIS/MDM:   Vitals:    Vitals:    03/06/23 0933 03/06/23 1144   BP: (!) 135/93 (!) 109/42   Pulse: 97 82   Resp: 16 16   Temp: 97.6 °F (36.4 °C)    TempSrc: Oral    SpO2: 100% 98%   Weight: 99 lb 3.3 oz (45 kg)    Height: 5' 6\" (1.676 m)        Patient was given the following medications:  Medications - No data to display          Is this patient to be included in the SEP-1 Core Measure due to severe sepsis or septic shock? No   Exclusion criteria - the patient is NOT to be included for SEP-1 Core Measure due to: Infection is not suspected    Chronic Conditions affecting care: Below   has a past medical history of Anxiety, GERD (gastroesophageal reflux disease), and Kidney stones. CONSULTS: (Who and What was discussed)  None          Records Reviewed (Source): PMH / Medications    CC/HPI Summary, DDx, ED Course, and Reassessment: Patient presents with chest discomfort as above. She states she injured her ribs when she leaned against the washer 2 weeks ago. She has had ongoing pain. She has developed some pain in her anterior chest as well. It is worse with movement. It is worse with inspiration. Patient was afebrile with stable vital upon. She was not tachycardic. She was not hypoxic.   She had some chest wall tenderness on exam.    Her work-up showed a stable H&H. Her potassium is minimally decreased at 4. Her sodium is minimally decreased at 134. Her renal function was normal.  Her troponin was less than 0.01.  D-dimer was 0.36. Disposition Considerations (tests considered but not done, Admit vs D/C, Shared Decision Making, Pt Expectation of Test or Tx.): Not tachycardic. She is not hypoxic. Her D-dimer is not elevated. I have a low index suspicion for pulmonary embolism. She has no acute EKG changes. She has chest wall tenderness. She has had pain for 2 weeks in her lateral chest and for about a week in her anterior chest.  She has no acute EKG changes. Her troponin is not elevated. I have a low index suspicion for cardiac etiology. She has no other respiratory symptoms. No evidence of pneumonia. No evidence of pneumothorax. I think her pain is most likely chest wall in origin. We discussed treatment options. She prefers to continue to use Tylenol and ibuprofen. She will follow-up with her primary care provider if not improved in 7 to 10 days. Test results, diagnosis, and treatment plan were discussed the patient. She understands treatment plan follow-up as discussed. I am the Primary Clinician of Record. PROCEDURES:  None    FINAL IMPRESSION      1.  Chest wall pain          DISPOSITION/PLAN   DISPOSITION Decision To Discharge 03/06/2023 12:55:00 PM      PATIENT REFERRED TO:  Louie Grier MD  [facility] [address]  [phone]    Schedule an appointment as soon as possible for a visit in 1 week  If not improved    DISCHARGE MEDICATIONS:  New Prescriptions    No medications on file       (Please note that portions of this note were completed with a voice recognition program.  Efforts were made to edit the dictations but occasionally words are mis-transcribed.)    Aranza Cordoba MD  Attending Emergency Physician        Fabiola Atkinson MD  03/06/23 9935

## 2023-03-06 NOTE — DISCHARGE INSTRUCTIONS
Tylenol and ibuprofen every 6 hours as needed for pain. Follow-up with your primary care provider in 7 to 10 days if not improved. Return as needed for worsening of symptoms or new symptoms of concern.

## 2023-03-06 NOTE — ED TRIAGE NOTES
Pt with chest pain directly on left breast that radiates into back/shoulder blade since Thursday. Pt states started tingling and then went to bed. Unable to take deep breath. States fell two weeks ago and hit left side of rib on washer. Denies cardiac hx.

## 2023-08-27 ENCOUNTER — APPOINTMENT (OUTPATIENT)
Dept: GENERAL RADIOLOGY | Age: 53
DRG: 690 | End: 2023-08-27
Payer: COMMERCIAL

## 2023-08-27 ENCOUNTER — HOSPITAL ENCOUNTER (EMERGENCY)
Age: 53
Discharge: HOME | DRG: 690 | End: 2023-08-27
Payer: COMMERCIAL

## 2023-08-27 ENCOUNTER — APPOINTMENT (OUTPATIENT)
Dept: CT IMAGING | Age: 53
DRG: 690 | End: 2023-08-27
Payer: COMMERCIAL

## 2023-08-27 VITALS
OXYGEN SATURATION: 97 % | WEIGHT: 100.97 LBS | HEART RATE: 79 BPM | DIASTOLIC BLOOD PRESSURE: 74 MMHG | SYSTOLIC BLOOD PRESSURE: 105 MMHG | RESPIRATION RATE: 17 BRPM | TEMPERATURE: 97 F | BODY MASS INDEX: 16.3 KG/M2

## 2023-08-27 DIAGNOSIS — N12 PYELITIS: ICD-10-CM

## 2023-08-27 DIAGNOSIS — N30.91 HEMORRHAGIC CYSTITIS: Primary | ICD-10-CM

## 2023-08-27 DIAGNOSIS — N28.89 URETERITIS DUE TO INFECTION: ICD-10-CM

## 2023-08-27 LAB
ALBUMIN SERPL-MCNC: 4.3 G/DL (ref 3.4–5)
ALP SERPL-CCNC: 103 U/L (ref 40–129)
ALT SERPL-CCNC: 17 U/L (ref 10–40)
ANION GAP SERPL CALCULATED.3IONS-SCNC: 10 MMOL/L (ref 3–16)
AST SERPL-CCNC: 22 U/L (ref 15–37)
BACTERIA URNS QL MICRO: ABNORMAL /HPF
BASOPHILS # BLD: 0 K/UL (ref 0–0.2)
BASOPHILS NFR BLD: 0.5 %
BILIRUB DIRECT SERPL-MCNC: <0.2 MG/DL (ref 0–0.3)
BILIRUB INDIRECT SERPL-MCNC: NORMAL MG/DL (ref 0–1)
BILIRUB SERPL-MCNC: <0.2 MG/DL (ref 0–1)
BILIRUB UR QL STRIP.AUTO: NEGATIVE
BUN SERPL-MCNC: 7 MG/DL (ref 7–20)
CALCIUM SERPL-MCNC: 9.3 MG/DL (ref 8.3–10.6)
CHLORIDE SERPL-SCNC: 104 MMOL/L (ref 99–110)
CLARITY UR: ABNORMAL
CO2 SERPL-SCNC: 26 MMOL/L (ref 21–32)
COLOR UR: ABNORMAL
CREAT SERPL-MCNC: 0.6 MG/DL (ref 0.6–1.1)
DEPRECATED RDW RBC AUTO: 13.1 % (ref 12.4–15.4)
EOSINOPHIL # BLD: 0 K/UL (ref 0–0.6)
EOSINOPHIL NFR BLD: 0.5 %
EPI CELLS #/AREA URNS AUTO: 4 /HPF (ref 0–5)
GFR SERPLBLD CREATININE-BSD FMLA CKD-EPI: >60 ML/MIN/{1.73_M2}
GLUCOSE SERPL-MCNC: 101 MG/DL (ref 70–99)
GLUCOSE UR STRIP.AUTO-MCNC: NEGATIVE MG/DL
HCG UR QL: NEGATIVE
HCT VFR BLD AUTO: 37.2 % (ref 36–48)
HGB BLD-MCNC: 12.9 G/DL (ref 12–16)
HGB UR QL STRIP.AUTO: ABNORMAL
HYALINE CASTS #/AREA URNS AUTO: 38 /LPF (ref 0–8)
INR PPP: 0.92 (ref 0.84–1.16)
KETONES UR STRIP.AUTO-MCNC: NEGATIVE MG/DL
LACTATE BLDV-SCNC: 1.1 MMOL/L (ref 0.4–1.9)
LEUKOCYTE ESTERASE UR QL STRIP.AUTO: ABNORMAL
LIPASE SERPL-CCNC: 33 U/L (ref 13–60)
LYMPHOCYTES # BLD: 2.5 K/UL (ref 1–5.1)
LYMPHOCYTES NFR BLD: 26.7 %
MCH RBC QN AUTO: 33.3 PG (ref 26–34)
MCHC RBC AUTO-ENTMCNC: 34.6 G/DL (ref 31–36)
MCV RBC AUTO: 96.5 FL (ref 80–100)
MONOCYTES # BLD: 0.5 K/UL (ref 0–1.3)
MONOCYTES NFR BLD: 4.9 %
NEUTROPHILS # BLD: 6.4 K/UL (ref 1.7–7.7)
NEUTROPHILS NFR BLD: 67.4 %
NITRITE UR QL STRIP.AUTO: POSITIVE
PH UR STRIP.AUTO: 6.5 [PH] (ref 5–8)
PLATELET # BLD AUTO: 221 K/UL (ref 135–450)
PMV BLD AUTO: 8.1 FL (ref 5–10.5)
POTASSIUM SERPL-SCNC: 3.6 MMOL/L (ref 3.5–5.1)
PROT SERPL-MCNC: 7.3 G/DL (ref 6.4–8.2)
PROT UR STRIP.AUTO-MCNC: 300 MG/DL
PROTHROMBIN TIME: 12.4 SEC (ref 11.5–14.8)
RBC # BLD AUTO: 3.86 M/UL (ref 4–5.2)
RBC CLUMPS #/AREA URNS AUTO: 3968 /HPF (ref 0–4)
SODIUM SERPL-SCNC: 140 MMOL/L (ref 136–145)
SP GR UR STRIP.AUTO: 1.01 (ref 1–1.03)
UA COMPLETE W REFLEX CULTURE PNL UR: YES
UA DIPSTICK W REFLEX MICRO PNL UR: YES
URN SPEC COLLECT METH UR: ABNORMAL
UROBILINOGEN UR STRIP-ACNC: 1 E.U./DL
WBC # BLD AUTO: 9.5 K/UL (ref 4–11)
WBC #/AREA URNS AUTO: 175 /HPF (ref 0–5)

## 2023-08-27 PROCEDURE — 80048 BASIC METABOLIC PNL TOTAL CA: CPT

## 2023-08-27 PROCEDURE — 96374 THER/PROPH/DIAG INJ IV PUSH: CPT

## 2023-08-27 PROCEDURE — 99285 EMERGENCY DEPT VISIT HI MDM: CPT

## 2023-08-27 PROCEDURE — 87186 SC STD MICRODIL/AGAR DIL: CPT

## 2023-08-27 PROCEDURE — 83690 ASSAY OF LIPASE: CPT

## 2023-08-27 PROCEDURE — 6360000004 HC RX CONTRAST MEDICATION: Performed by: PHYSICIAN ASSISTANT

## 2023-08-27 PROCEDURE — 36415 COLL VENOUS BLD VENIPUNCTURE: CPT

## 2023-08-27 PROCEDURE — 71045 X-RAY EXAM CHEST 1 VIEW: CPT

## 2023-08-27 PROCEDURE — 83605 ASSAY OF LACTIC ACID: CPT

## 2023-08-27 PROCEDURE — 85025 COMPLETE CBC W/AUTO DIFF WBC: CPT

## 2023-08-27 PROCEDURE — 80076 HEPATIC FUNCTION PANEL: CPT

## 2023-08-27 PROCEDURE — 87040 BLOOD CULTURE FOR BACTERIA: CPT

## 2023-08-27 PROCEDURE — 96365 THER/PROPH/DIAG IV INF INIT: CPT

## 2023-08-27 PROCEDURE — 87086 URINE CULTURE/COLONY COUNT: CPT

## 2023-08-27 PROCEDURE — 87088 URINE BACTERIA CULTURE: CPT

## 2023-08-27 PROCEDURE — 2580000003 HC RX 258: Performed by: PHYSICIAN ASSISTANT

## 2023-08-27 PROCEDURE — 81001 URINALYSIS AUTO W/SCOPE: CPT

## 2023-08-27 PROCEDURE — 96375 TX/PRO/DX INJ NEW DRUG ADDON: CPT

## 2023-08-27 PROCEDURE — 6360000002 HC RX W HCPCS: Performed by: PHYSICIAN ASSISTANT

## 2023-08-27 PROCEDURE — 85610 PROTHROMBIN TIME: CPT

## 2023-08-27 PROCEDURE — 84703 CHORIONIC GONADOTROPIN ASSAY: CPT

## 2023-08-27 PROCEDURE — 74177 CT ABD & PELVIS W/CONTRAST: CPT

## 2023-08-27 RX ORDER — SODIUM CHLORIDE, SODIUM LACTATE, POTASSIUM CHLORIDE, AND CALCIUM CHLORIDE .6; .31; .03; .02 G/100ML; G/100ML; G/100ML; G/100ML
1000 INJECTION, SOLUTION INTRAVENOUS ONCE
Status: COMPLETED | OUTPATIENT
Start: 2023-08-27 | End: 2023-08-27

## 2023-08-27 RX ORDER — KETOROLAC TROMETHAMINE 15 MG/ML
15 INJECTION, SOLUTION INTRAMUSCULAR; INTRAVENOUS ONCE
Status: COMPLETED | OUTPATIENT
Start: 2023-08-27 | End: 2023-08-27

## 2023-08-27 RX ORDER — ONDANSETRON 2 MG/ML
4 INJECTION INTRAMUSCULAR; INTRAVENOUS ONCE
Status: COMPLETED | OUTPATIENT
Start: 2023-08-27 | End: 2023-08-27

## 2023-08-27 RX ORDER — CEFDINIR 300 MG/1
300 CAPSULE ORAL 2 TIMES DAILY
Qty: 20 CAPSULE | Refills: 0 | Status: ON HOLD
Start: 2023-08-27 | End: 2023-09-01 | Stop reason: HOSPADM

## 2023-08-27 RX ORDER — PHENAZOPYRIDINE HYDROCHLORIDE 100 MG/1
100 TABLET, FILM COATED ORAL 3 TIMES DAILY
Qty: 6 TABLET | Refills: 0 | Status: SHIPPED
Start: 2023-08-27 | End: 2023-09-01 | Stop reason: HOSPADM

## 2023-08-27 RX ADMIN — KETOROLAC TROMETHAMINE 15 MG: 15 INJECTION, SOLUTION INTRAMUSCULAR; INTRAVENOUS at 10:03

## 2023-08-27 RX ADMIN — CEFTRIAXONE 1000 MG: 1 INJECTION, POWDER, FOR SOLUTION INTRAMUSCULAR; INTRAVENOUS at 10:02

## 2023-08-27 RX ADMIN — IOPAMIDOL 75 ML: 755 INJECTION, SOLUTION INTRAVENOUS at 10:38

## 2023-08-27 RX ADMIN — SODIUM CHLORIDE, POTASSIUM CHLORIDE, SODIUM LACTATE AND CALCIUM CHLORIDE 1000 ML: 600; 310; 30; 20 INJECTION, SOLUTION INTRAVENOUS at 10:02

## 2023-08-27 RX ADMIN — ONDANSETRON 4 MG: 2 INJECTION INTRAMUSCULAR; INTRAVENOUS at 10:02

## 2023-08-27 ASSESSMENT — PAIN DESCRIPTION - FREQUENCY: FREQUENCY: CONTINUOUS

## 2023-08-27 ASSESSMENT — PAIN DESCRIPTION - ORIENTATION: ORIENTATION: RIGHT

## 2023-08-27 ASSESSMENT — PAIN DESCRIPTION - DESCRIPTORS: DESCRIPTORS: ACHING

## 2023-08-27 ASSESSMENT — PAIN - FUNCTIONAL ASSESSMENT: PAIN_FUNCTIONAL_ASSESSMENT: 0-10

## 2023-08-27 ASSESSMENT — PAIN SCALES - GENERAL
PAINLEVEL_OUTOF10: 3
PAINLEVEL_OUTOF10: 7
PAINLEVEL_OUTOF10: 0

## 2023-08-27 ASSESSMENT — PAIN DESCRIPTION - LOCATION: LOCATION: FLANK

## 2023-08-27 ASSESSMENT — PAIN DESCRIPTION - PAIN TYPE: TYPE: ACUTE PAIN

## 2023-08-27 NOTE — DISCHARGE INSTRUCTIONS
Your WBC 9.5. Kidney function normal.  Lactic not elevated 1.1.  UA shows right character with large blood, positive nitrate and large leukocyte. Microscopic urine shows 4+ bacteria, 175 WBC and 3968 RBC. CT scan showing some thickening of the bladder, right renal pelvis and the right ureter. No stones noted. Rocephin 1 g IVPB given in ED. Will send home with Omnicef and Pyridium. You may take ibuprofen 600 mg along with Tylenol 1000 mg every 6 or 8 hours for pain control. Recommend follow with PCP to review culture results on Tuesday. [facility] the [facility] as well as the sensitivities.

## 2023-08-27 NOTE — ED PROVIDER NOTES
[address] [facility] [address] [address]        Pt Name: Bhumi Barry  MRN: [id number]  [address] [facility] 1970  Date of evaluation: 2023  Provider: Michelle Malagon PA-C  PCP: Jc Diaz MD  Note Started: 9:25 AM EDT 23      JEN. I have evaluated this patient. CHIEF COMPLAINT       Chief Complaint   Patient presents with    Hematuria     Pt reports linden blood in urine today, none yesterday. hx kidney stones       HISTORY OF PRESENT ILLNESS: 1 or more Elements     History From: Patient    Bhumi Barry is a 48 y.o. female who presents to the emergency department with complaint hematuria. Patient reports having some mild right flank discomfort with known history of kidney stone. Blood with wiping noted on Thursday. No complaints until 1 AM this date when she noticed some blood with urination as well as at 3 AM.  At 8 AM she had blood in bowl water with linden hematuria. This was very concerning to her. She does report having been treated for UTI completed antibiotics Tuesday of this past week. She believes she was on Keflex 500 mg twice daily. Patient did take medication as recommended. She had increased her fluid intake. She is concerned because her mother developed hematuria and a subsequent with sepsis and thigh. Patient concerned and worried and emotional.  She reports no chest pain or shortness of breath. No fevers or chills. Initial heart rate 109. Nursing Notes were all reviewed and agreed with or any disagreements were addressed in the HPI. REVIEW OF SYSTEMS :      Review of Systems    Positives and Pertinent negatives as per HPI.      SURGICAL HISTORY     Past Surgical History:   Procedure Laterality Date     SECTION      ENDOSCOPY, COLON, DIAGNOSTIC      TUBAL LIGATION      UPPER GASTROINTESTINAL ENDOSCOPY N/A 2022    EGD ESOPHAGOGASTRODUODENOSCOPY performed by Dima Jiang MD at [facility] hydronephrosis and hydroureter. This may represent a   pyelitis/ureteritis. No calculi are noted. Small bilateral renal cysts. XR CHEST PORTABLE   Final Result   No radiographic evidence of acute cardiopulmonary pathology. No results found. No results found. PROCEDURES   Unless otherwise noted below, none     Procedures    CRITICAL CARE TIME (.cctime)       PAST MEDICAL HISTORY      has a past medical history of Anxiety, GERD (gastroesophageal reflux disease), and Kidney stones. EMERGENCY DEPARTMENT COURSE and DIFFERENTIAL DIAGNOSIS/MDM:   Vitals:    Vitals:    08/27/23 1130 08/27/23 1145 08/27/23 1200 08/27/23 1215   BP: 101/73 103/77 100/76 105/74   Pulse: 78 82 78 79   Resp: 10 24 11 17   Temp:       TempSrc:       SpO2: 97% 95% 97% 97%   Weight:           Patient was given the following medications:  Medications   lactated ringers bolus bolus 1,000 mL (0 mLs IntraVENous Stopped 8/27/23 1035)   cefTRIAXone (ROCEPHIN) 1,000 mg in sodium chloride 0.9 % 50 mL IVPB (mini-bag) (0 mg IntraVENous Stopped 8/27/23 1034)   ketorolac (TORADOL) injection 15 mg (15 mg IntraVENous Given 8/27/23 1003)   ondansetron (ZOFRAN) injection 4 mg (4 mg IntraVENous Given 8/27/23 1002)   iopamidol (ISOVUE-370) 76 % injection 75 mL (75 mLs IntraVENous Given 8/27/23 1038)             Is this patient to be included in the SEP-1 Core Measure due to severe sepsis or septic shock? No   Exclusion criteria - the patient is NOT to be included for SEP-1 Core Measure due to: Infection is not suspected    Chronic Conditions affecting care: History kidney stones   has a past medical history of Anxiety, GERD (gastroesophageal reflux disease), and Kidney stones. CONSULTS: (Who and What was discussed)  None    Records Reviewed (External and Source) none    CC/HPI Summary, DDx, ED Course, and Reassessment:     Patient presenting with concern of UTI. Symptoms consistent. WBC 9.5 and hemoglobin 12.9.   Lipase

## 2023-08-27 NOTE — ED TRIAGE NOTES
Pt arrived via private vehicle from home c/o linden blood in her urine this morning with burning. Pt report some blood on toilet paper yesterday and a history of kidney stones. Pt reports 3 out of 10 right flank pain that started en route to the hospital today. Pt A&Ox4, ambulatory independently. Tachycardic at this time, other VS stable. Respirations even, easy, unlabored.

## 2023-08-27 NOTE — ED NOTES
Pt to 32 from [address] [facility] [address] with steady gait. Pt hunched over slight d/t pain on walk. Pt placed in gown on cardiac monitoring. PIV placed in the [facility]. Blood sent to lab for further analysis. BESSY Mehta updated pt on plan of care. Side rails x 1 (right side). Call light within reach. VSS/afebrile.       Meliza De La Rosa RN  08/27/23 2121

## 2023-08-29 ENCOUNTER — APPOINTMENT (OUTPATIENT)
Dept: CT IMAGING | Age: 53
DRG: 690 | End: 2023-08-29
Payer: COMMERCIAL

## 2023-08-29 ENCOUNTER — HOSPITAL ENCOUNTER (INPATIENT)
Age: 53
LOS: 2 days | Discharge: HOME | DRG: 690 | End: 2023-09-01
Attending: SPECIALIST | Admitting: SPECIALIST
Payer: COMMERCIAL

## 2023-08-29 DIAGNOSIS — R06.02 SHORTNESS OF BREATH: ICD-10-CM

## 2023-08-29 DIAGNOSIS — N39.0 E-COLI UTI: ICD-10-CM

## 2023-08-29 DIAGNOSIS — N13.30 HYDRONEPHROSIS OF RIGHT KIDNEY: ICD-10-CM

## 2023-08-29 DIAGNOSIS — R10.9 RIGHT FLANK PAIN: Primary | ICD-10-CM

## 2023-08-29 DIAGNOSIS — B96.20 E-COLI UTI: ICD-10-CM

## 2023-08-29 LAB
BACTERIA UR CULT: ABNORMAL
BASOPHILS # BLD: 0 K/UL (ref 0–0.2)
BASOPHILS NFR BLD: 0.5 %
BILIRUB UR QL STRIP.AUTO: NEGATIVE
CLARITY UR: CLEAR
COLOR UR: YELLOW
DEPRECATED RDW RBC AUTO: 13 % (ref 12.4–15.4)
EOSINOPHIL # BLD: 0 K/UL (ref 0–0.6)
EOSINOPHIL NFR BLD: 0.2 %
GLUCOSE UR STRIP.AUTO-MCNC: NEGATIVE MG/DL
HCT VFR BLD AUTO: 33.3 % (ref 36–48)
HGB BLD-MCNC: 11.4 G/DL (ref 12–16)
HGB UR QL STRIP.AUTO: NEGATIVE
KETONES UR STRIP.AUTO-MCNC: NEGATIVE MG/DL
LACTATE BLDV-SCNC: 0.7 MMOL/L (ref 0.4–1.9)
LEUKOCYTE ESTERASE UR QL STRIP.AUTO: NEGATIVE
LYMPHOCYTES # BLD: 3.6 K/UL (ref 1–5.1)
LYMPHOCYTES NFR BLD: 47.6 %
MCH RBC QN AUTO: 32.7 PG (ref 26–34)
MCHC RBC AUTO-ENTMCNC: 34.2 G/DL (ref 31–36)
MCV RBC AUTO: 95.5 FL (ref 80–100)
MONOCYTES # BLD: 0.4 K/UL (ref 0–1.3)
MONOCYTES NFR BLD: 5.6 %
NEUTROPHILS # BLD: 3.5 K/UL (ref 1.7–7.7)
NEUTROPHILS NFR BLD: 46.1 %
NITRITE UR QL STRIP.AUTO: NEGATIVE
ORGANISM: ABNORMAL
PH UR STRIP.AUTO: 7.5 [PH] (ref 5–8)
PLATELET # BLD AUTO: 246 K/UL (ref 135–450)
PMV BLD AUTO: 8.7 FL (ref 5–10.5)
PROT UR STRIP.AUTO-MCNC: NEGATIVE MG/DL
RBC # BLD AUTO: 3.49 M/UL (ref 4–5.2)
SP GR UR STRIP.AUTO: 1 (ref 1–1.03)
UA COMPLETE W REFLEX CULTURE PNL UR: NORMAL
UA DIPSTICK W REFLEX MICRO PNL UR: NORMAL
URN SPEC COLLECT METH UR: NORMAL
UROBILINOGEN UR STRIP-ACNC: 0.2 E.U./DL
WBC # BLD AUTO: 7.7 K/UL (ref 4–11)

## 2023-08-29 PROCEDURE — 71260 CT THORAX DX C+: CPT

## 2023-08-29 PROCEDURE — 99285 EMERGENCY DEPT VISIT HI MDM: CPT

## 2023-08-29 PROCEDURE — 81003 URINALYSIS AUTO W/O SCOPE: CPT

## 2023-08-29 PROCEDURE — 6360000002 HC RX W HCPCS: Performed by: NURSE PRACTITIONER

## 2023-08-29 PROCEDURE — 96374 THER/PROPH/DIAG INJ IV PUSH: CPT

## 2023-08-29 PROCEDURE — 96375 TX/PRO/DX INJ NEW DRUG ADDON: CPT

## 2023-08-29 PROCEDURE — 6360000004 HC RX CONTRAST MEDICATION: Performed by: NURSE PRACTITIONER

## 2023-08-29 PROCEDURE — 85025 COMPLETE CBC W/AUTO DIFF WBC: CPT

## 2023-08-29 PROCEDURE — 2580000003 HC RX 258: Performed by: NURSE PRACTITIONER

## 2023-08-29 PROCEDURE — 83605 ASSAY OF LACTIC ACID: CPT

## 2023-08-29 PROCEDURE — 36415 COLL VENOUS BLD VENIPUNCTURE: CPT

## 2023-08-29 PROCEDURE — 87040 BLOOD CULTURE FOR BACTERIA: CPT

## 2023-08-29 RX ORDER — SODIUM CHLORIDE, SODIUM LACTATE, POTASSIUM CHLORIDE, CALCIUM CHLORIDE 600; 310; 30; 20 MG/100ML; MG/100ML; MG/100ML; MG/100ML
INJECTION, SOLUTION INTRAVENOUS CONTINUOUS
Status: DISCONTINUED | OUTPATIENT
Start: 2023-08-29 | End: 2023-08-30

## 2023-08-29 RX ORDER — ONDANSETRON 2 MG/ML
4 INJECTION INTRAMUSCULAR; INTRAVENOUS ONCE
Status: COMPLETED | OUTPATIENT
Start: 2023-08-29 | End: 2023-08-29

## 2023-08-29 RX ORDER — MORPHINE SULFATE 2 MG/ML
2 INJECTION, SOLUTION INTRAMUSCULAR; INTRAVENOUS ONCE
Status: COMPLETED | OUTPATIENT
Start: 2023-08-29 | End: 2023-08-29

## 2023-08-29 RX ADMIN — IOPAMIDOL 75 ML: 755 INJECTION, SOLUTION INTRAVENOUS at 23:07

## 2023-08-29 RX ADMIN — ONDANSETRON 4 MG: 2 INJECTION INTRAMUSCULAR; INTRAVENOUS at 22:47

## 2023-08-29 RX ADMIN — MORPHINE SULFATE 2 MG: 2 INJECTION, SOLUTION INTRAMUSCULAR; INTRAVENOUS at 22:49

## 2023-08-29 RX ADMIN — SODIUM CHLORIDE, POTASSIUM CHLORIDE, SODIUM LACTATE AND CALCIUM CHLORIDE: 600; 310; 30; 20 INJECTION, SOLUTION INTRAVENOUS at 22:51

## 2023-08-29 ASSESSMENT — PAIN DESCRIPTION - LOCATION: LOCATION: FLANK

## 2023-08-29 ASSESSMENT — PAIN SCALES - GENERAL
PAINLEVEL_OUTOF10: 6
PAINLEVEL_OUTOF10: 6

## 2023-08-29 ASSESSMENT — PAIN DESCRIPTION - DESCRIPTORS: DESCRIPTORS: SHARP

## 2023-08-29 ASSESSMENT — PAIN - FUNCTIONAL ASSESSMENT: PAIN_FUNCTIONAL_ASSESSMENT: 0-10

## 2023-08-29 ASSESSMENT — PAIN DESCRIPTION - ORIENTATION: ORIENTATION: RIGHT

## 2023-08-29 ASSESSMENT — PAIN DESCRIPTION - FREQUENCY: FREQUENCY: CONTINUOUS

## 2023-08-29 ASSESSMENT — PAIN DESCRIPTION - PAIN TYPE: TYPE: ACUTE PAIN

## 2023-08-30 PROBLEM — R10.9 FLANK PAIN: Status: ACTIVE | Noted: 2023-08-30

## 2023-08-30 LAB
C DIFF TOX A+B STL QL IA: NORMAL
LACTATE BLDV-SCNC: 0.6 MMOL/L (ref 0.4–1.9)

## 2023-08-30 PROCEDURE — 6370000000 HC RX 637 (ALT 250 FOR IP): Performed by: SPECIALIST

## 2023-08-30 PROCEDURE — 6360000002 HC RX W HCPCS: Performed by: SPECIALIST

## 2023-08-30 PROCEDURE — 83605 ASSAY OF LACTIC ACID: CPT

## 2023-08-30 PROCEDURE — 1200000000 HC SEMI PRIVATE

## 2023-08-30 PROCEDURE — 6360000002 HC RX W HCPCS: Performed by: NURSE PRACTITIONER

## 2023-08-30 PROCEDURE — 36415 COLL VENOUS BLD VENIPUNCTURE: CPT

## 2023-08-30 PROCEDURE — 96375 TX/PRO/DX INJ NEW DRUG ADDON: CPT

## 2023-08-30 PROCEDURE — 87449 NOS EACH ORGANISM AG IA: CPT

## 2023-08-30 PROCEDURE — 87324 CLOSTRIDIUM AG IA: CPT

## 2023-08-30 PROCEDURE — 2580000003 HC RX 258: Performed by: SPECIALIST

## 2023-08-30 RX ORDER — PANTOPRAZOLE SODIUM 40 MG/1
40 TABLET, DELAYED RELEASE ORAL
Status: DISCONTINUED | OUTPATIENT
Start: 2023-08-30 | End: 2023-09-01 | Stop reason: HOSPADM

## 2023-08-30 RX ORDER — AZELASTINE 1 MG/ML
1 SPRAY, METERED NASAL 2 TIMES DAILY
Status: DISCONTINUED | OUTPATIENT
Start: 2023-08-30 | End: 2023-08-30 | Stop reason: CLARIF

## 2023-08-30 RX ORDER — CETIRIZINE HYDROCHLORIDE 10 MG/1
5 TABLET ORAL DAILY
Status: DISCONTINUED | OUTPATIENT
Start: 2023-08-30 | End: 2023-09-01 | Stop reason: HOSPADM

## 2023-08-30 RX ORDER — CIPROFLOXACIN 2 MG/ML
400 INJECTION, SOLUTION INTRAVENOUS ONCE
Status: COMPLETED | OUTPATIENT
Start: 2023-08-30 | End: 2023-08-30

## 2023-08-30 RX ORDER — ONDANSETRON 2 MG/ML
4 INJECTION INTRAMUSCULAR; INTRAVENOUS EVERY 4 HOURS
Status: DISCONTINUED | OUTPATIENT
Start: 2023-08-30 | End: 2023-08-30

## 2023-08-30 RX ORDER — ONDANSETRON 2 MG/ML
4 INJECTION INTRAMUSCULAR; INTRAVENOUS EVERY 4 HOURS PRN
Status: DISCONTINUED | OUTPATIENT
Start: 2023-08-30 | End: 2023-09-01 | Stop reason: HOSPADM

## 2023-08-30 RX ORDER — ACETAMINOPHEN 325 MG/1
650 TABLET ORAL EVERY 6 HOURS PRN
Status: DISCONTINUED | OUTPATIENT
Start: 2023-08-30 | End: 2023-09-01 | Stop reason: HOSPADM

## 2023-08-30 RX ORDER — SODIUM CHLORIDE 450 MG/100ML
INJECTION, SOLUTION INTRAVENOUS CONTINUOUS
Status: DISCONTINUED | OUTPATIENT
Start: 2023-08-30 | End: 2023-09-01 | Stop reason: HOSPADM

## 2023-08-30 RX ORDER — DIAZEPAM 5 MG/1
5 TABLET ORAL EVERY 12 HOURS PRN
Status: DISCONTINUED | OUTPATIENT
Start: 2023-08-30 | End: 2023-09-01 | Stop reason: HOSPADM

## 2023-08-30 RX ORDER — CIPROFLOXACIN 2 MG/ML
400 INJECTION, SOLUTION INTRAVENOUS EVERY 12 HOURS
Status: DISCONTINUED | OUTPATIENT
Start: 2023-08-30 | End: 2023-09-01 | Stop reason: HOSPADM

## 2023-08-30 RX ORDER — TRAMADOL HYDROCHLORIDE 50 MG/1
50 TABLET ORAL EVERY 6 HOURS PRN
Status: DISCONTINUED | OUTPATIENT
Start: 2023-08-30 | End: 2023-09-01 | Stop reason: HOSPADM

## 2023-08-30 RX ADMIN — CIPROFLOXACIN 400 MG: 400 INJECTION, SOLUTION INTRAVENOUS at 16:46

## 2023-08-30 RX ADMIN — SODIUM CHLORIDE 1000 ML: 4.5 INJECTION, SOLUTION INTRAVENOUS at 06:36

## 2023-08-30 RX ADMIN — ONDANSETRON 4 MG: 2 INJECTION INTRAMUSCULAR; INTRAVENOUS at 20:15

## 2023-08-30 RX ADMIN — TRAMADOL HYDROCHLORIDE 50 MG: 50 TABLET ORAL at 20:14

## 2023-08-30 RX ADMIN — CIPROFLOXACIN 400 MG: 400 INJECTION, SOLUTION INTRAVENOUS at 02:32

## 2023-08-30 RX ADMIN — PANTOPRAZOLE SODIUM 40 MG: 40 TABLET, DELAYED RELEASE ORAL at 10:18

## 2023-08-30 RX ADMIN — ACETAMINOPHEN 650 MG: 325 TABLET ORAL at 16:47

## 2023-08-30 RX ADMIN — DIAZEPAM 5 MG: 5 TABLET ORAL at 10:18

## 2023-08-30 ASSESSMENT — PAIN DESCRIPTION - LOCATION
LOCATION: FLANK

## 2023-08-30 ASSESSMENT — PAIN DESCRIPTION - ORIENTATION
ORIENTATION: LEFT
ORIENTATION: LEFT
ORIENTATION: RIGHT

## 2023-08-30 ASSESSMENT — ENCOUNTER SYMPTOMS
ABDOMINAL PAIN: 0
SHORTNESS OF BREATH: 1

## 2023-08-30 ASSESSMENT — PAIN SCALES - GENERAL
PAINLEVEL_OUTOF10: 5
PAINLEVEL_OUTOF10: 0

## 2023-08-30 ASSESSMENT — PAIN DESCRIPTION - ONSET: ONSET: ON-GOING

## 2023-08-30 ASSESSMENT — PAIN - FUNCTIONAL ASSESSMENT
PAIN_FUNCTIONAL_ASSESSMENT: PREVENTS OR INTERFERES SOME ACTIVE ACTIVITIES AND ADLS
PAIN_FUNCTIONAL_ASSESSMENT: ACTIVITIES ARE NOT PREVENTED
PAIN_FUNCTIONAL_ASSESSMENT: ACTIVITIES ARE NOT PREVENTED

## 2023-08-30 ASSESSMENT — PAIN DESCRIPTION - PAIN TYPE: TYPE: ACUTE PAIN

## 2023-08-30 ASSESSMENT — PAIN DESCRIPTION - FREQUENCY
FREQUENCY: CONTINUOUS
FREQUENCY: CONTINUOUS

## 2023-08-30 ASSESSMENT — PAIN DESCRIPTION - DESCRIPTORS
DESCRIPTORS: DULL
DESCRIPTORS: SHARP;NAGGING
DESCRIPTORS: DULL

## 2023-08-30 NOTE — ED NOTES
Patient resting comfortably, respirations easy, unlabored. Patient in no acute distress. Denies needs at this time. Call light within reach, bed in lowest position, side rails up x 2.         Amari Cardenas RN  08/30/23 9962

## 2023-08-30 NOTE — H&P
H & bP dictated  [id number]  Acute pyelonephritis,R side  R flank pain,  E coli UTI   PACO  GERD  L small 2 mm kidney stone w/o hydronephrosis,  Hx of cystitis with hematuria  3 days ago.   Admit, Cont IV antibiotic, Urine Cx pos for E coli ss to Cipro, cont  home med, f/u labs,   Dr Anna Stephens

## 2023-08-30 NOTE — ED NOTES
Report given to Louis CARDONA, all questions answered at this time     Dixie Martinez RN  08/30/23 5657

## 2023-08-30 NOTE — H&P
[address], [address]                              HISTORY AND PHYSICAL    PATIENT NAME: Nico Chamberlain                    :        1970  MED REC NO:   [id number]                          ROOM:       [id number]  ACCOUNT NO:   [de-identified]                           ADMIT DATE: 2023  PROVIDER:     Kandace Richey MD    ATTENDING PROVIDER:  Kandace Richey MD    CHIEF COMPLAINT:  Flank pain and some fever and chills. HISTORY OF PRESENT ILLNESS:  This 82-year-old female patient came to the  emergency room on 2023 first with history of hematuria. The  patient was treated with cystitis and culture was positive for E. coli. The patient received Omnicef and sent home and did not follow with me. The patient came back three days later because she is having some chills  and flank pain. The patient is here today and urine today is fine,  improved; however, CT scan showed that the patient has a nonobstructive  2 mm left renal stone, no hydronephrosis and had some persistent right  pelvic ectasia. No hydronephrosis and some hyperemia suspect  pyelonephritis. The patient developed fever and chills. WBC count was  fine, so started on antibiotic therapy Cipro. The patient's flank pain  comes and goes. No hematuria. The patient will be admitted for further  antibiotic therapy. PAST MEDICAL HISTORY:  Significant for chronic anxiety, GI bleed,  history of UTI, and kidney stone. PAST SURGICAL HISTORY:  Significant for , endoscopy, tubal  ligation. FAMILY HISTORY:  Noncontributory. SOCIAL HISTORY:  The patient is , has children. History of  smoking, he cut back the smoking. Occasionally drinks. MEDICATIONS:  See the reconciliation sheet. ALLERGIES:  SULFA. REVIEW OF SYSTEMS:  No headache. No visual symptoms. No swallowing  problem. No ear pain. No chest pain. No cough.   No wheezing. Had  some back pain on the right side with flank pain. No hematuria. No  nausea or vomiting. Had some fever and chills. PHYSICAL EXAMINATION:  GENERAL:  The patient is alert, oriented, well-developed,  well-nourished, anxious, small frame, not in apparent distress. VITAL SIGNS:  The patient's blood pressure was 115/72, respiratory rate  was 18, pulse 68, temperature 97.8. The patient weighed 101 pounds. Saturation was 95% on room air. HEENT:  Head is normocephalic and atraumatic. Pupils are equal on both  sides, reactive to light and accommodation. Ears, Nose, and Throat: Within normal limits. Mucous membranes moist.  NECK:  Supple. No JVD. No lymphadenopathy. No thyromegaly. CHEST:  Lungs clear bilaterally. CARDIOVASCULAR:  Heart S1 and S2.  Regular rhythm. ABDOMEN:  Soft. Bowel sounds present. No masses. No  hepatosplenomegaly. Had some mild right flank tenderness. EXTREMITIES:  No edema. No cyanosis. No clubbing. SKIN:  Warm and dry. LABORATORY STUDIES:  WBC count was 7.7, hemoglobin 11.4, hematocrit  53.3, and platelet count 409. Sodium was 140, potassium 3.6, chloride  104, CO2 is 26, BUN 7, creatinine 0.6, glucose was 101. LFTs are  normal.  Today's urine is fine. The previous urine was positive three  days ago and going E. coli, sensitive to Cipro and cefazolin, cefepime,  ceftriaxone. Resistant to ampicillin. CT scan of the abdomen showed a  non-obstructing left kidney stone, small 2 mm, no hydronephrosis and the  right cystic pyelonephritis. ASSESSMENT:  Right flank pain, suspect pyelonephritis, history of  hematuria, cystitis, E. coli, UTI, anxiety, GERD. PLAN:  Continue the antibiotic therapy Cipro IV. The patient to resume  some home medication. Follow up labs. I spent more than 30 minutes in face-to-face evaluation with the patient  and discussed management and findings with the patient and nursing  staff.         Chris Mccarthy MD    D:

## 2023-08-30 NOTE — ED TRIAGE NOTES
Patient states that she was here in the ED on Sunday due to blood in urine. She is not feeling better and according to her test results she has an infection. Patient woke up this morning with a fever and is also having right flank pain/tingling.  Patient took 600 mg of Motrin at 6 am, 12 pm and 6 pm.

## 2023-08-30 NOTE — ED NOTES
Report called to [facility], all questions answered at this time     Elle Reed RN  08/30/23 9202

## 2023-08-30 NOTE — ED PROVIDER NOTES
[address] [facility] [address]  Dept: [phone]  Loc: [phone]  eMERGENCYdEPARTMENT eNCOUnter      Pt Name: Ricardo Henry  MRN: [id number]  [address] [facility] 1970  Date of evaluation: 8/29/2023  Provider:DAVE Long CNP      Independently seen and evaluated by the advanced practice provider  CHIEF COMPLAINT       Chief Complaint   Patient presents with    Shortness of Breath     Patient states that she was here in the ED on Sunday due to blood in urine. She is not feeling better and according to her test results she has an infection. Patient woke up this morning with a fever and is also having right flank pain/tingling. Patient took 600 mg of Motrin at 6 am, 12 pm and 6 pm.        CRITICAL CARE TIME       HISTORY OF PRESENT ILLNESS  (Location/Symptom, Timing/Onset, Context/Setting, Quality, Duration,Modifying Factors, Severity.)   Ricardo Henry is a 48 y.o. female who presents to the emergency department PMHx: Anxiety, gastric reflux, kidney stones    Lives at home  Anticoagulation therapy none  CODE STATUS full  Social determinants not identified    HPI provided by the patient    Patient presents to the emergency department today complaining of shortness of breath and right lower back pain with a tingling sensation, hurts to deep breathe. Patient reports that she was seen in the emergency department 2 days ago treated for pyelonephritis. She followed her results on MyChart and her urine culture is growing E. coli. She reports a fever with right flank pain. She has been taking Motrin 600 mg at least 3 times today. Nursing Notes were reviewedand agreed with or any disagreements were addressed in the HPI. REVIEW OF SYSTEMS    (2-9 systems for level 4, 10 or more for level 5)     Review of Systems   Constitutional:  Positive for activity change, appetite change, chills and fatigue.

## 2023-08-31 LAB
ANION GAP SERPL CALCULATED.3IONS-SCNC: 8 MMOL/L (ref 3–16)
BACTERIA BLD CULT: NORMAL
BASOPHILS # BLD: 0 K/UL (ref 0–0.2)
BASOPHILS NFR BLD: 0.2 %
BUN SERPL-MCNC: 4 MG/DL (ref 7–20)
CALCIUM SERPL-MCNC: 8.8 MG/DL (ref 8.3–10.6)
CHLORIDE SERPL-SCNC: 106 MMOL/L (ref 99–110)
CO2 SERPL-SCNC: 26 MMOL/L (ref 21–32)
CREAT SERPL-MCNC: 0.6 MG/DL (ref 0.6–1.1)
DEPRECATED RDW RBC AUTO: 12.4 % (ref 12.4–15.4)
EOSINOPHIL # BLD: 0 K/UL (ref 0–0.6)
EOSINOPHIL NFR BLD: 0.5 %
GFR SERPLBLD CREATININE-BSD FMLA CKD-EPI: >60 ML/MIN/{1.73_M2}
GLUCOSE SERPL-MCNC: 118 MG/DL (ref 70–99)
HCT VFR BLD AUTO: 31.1 % (ref 36–48)
HGB BLD-MCNC: 10.9 G/DL (ref 12–16)
LYMPHOCYTES # BLD: 2.5 K/UL (ref 1–5.1)
LYMPHOCYTES NFR BLD: 52.5 %
MAGNESIUM SERPL-MCNC: 1.8 MG/DL (ref 1.8–2.4)
MCH RBC QN AUTO: 33.2 PG (ref 26–34)
MCHC RBC AUTO-ENTMCNC: 35 G/DL (ref 31–36)
MCV RBC AUTO: 94.7 FL (ref 80–100)
MONOCYTES # BLD: 0.2 K/UL (ref 0–1.3)
MONOCYTES NFR BLD: 4.9 %
NEUTROPHILS # BLD: 2 K/UL (ref 1.7–7.7)
NEUTROPHILS NFR BLD: 41.9 %
PLATELET # BLD AUTO: 215 K/UL (ref 135–450)
PMV BLD AUTO: 8.4 FL (ref 5–10.5)
POTASSIUM SERPL-SCNC: 2.7 MMOL/L (ref 3.5–5.1)
RBC # BLD AUTO: 3.28 M/UL (ref 4–5.2)
SODIUM SERPL-SCNC: 140 MMOL/L (ref 136–145)
WBC # BLD AUTO: 4.8 K/UL (ref 4–11)

## 2023-08-31 PROCEDURE — 94760 N-INVAS EAR/PLS OXIMETRY 1: CPT

## 2023-08-31 PROCEDURE — 2580000003 HC RX 258: Performed by: SPECIALIST

## 2023-08-31 PROCEDURE — 6360000002 HC RX W HCPCS: Performed by: SPECIALIST

## 2023-08-31 PROCEDURE — 6370000000 HC RX 637 (ALT 250 FOR IP): Performed by: SPECIALIST

## 2023-08-31 PROCEDURE — 80048 BASIC METABOLIC PNL TOTAL CA: CPT

## 2023-08-31 PROCEDURE — 83735 ASSAY OF MAGNESIUM: CPT

## 2023-08-31 PROCEDURE — 36415 COLL VENOUS BLD VENIPUNCTURE: CPT

## 2023-08-31 PROCEDURE — 1200000000 HC SEMI PRIVATE

## 2023-08-31 PROCEDURE — 85025 COMPLETE CBC W/AUTO DIFF WBC: CPT

## 2023-08-31 RX ORDER — POTASSIUM CHLORIDE 7.45 MG/ML
10 INJECTION INTRAVENOUS PRN
Status: DISCONTINUED | OUTPATIENT
Start: 2023-08-31 | End: 2023-09-01 | Stop reason: HOSPADM

## 2023-08-31 RX ORDER — POTASSIUM CHLORIDE 20 MEQ/1
40 TABLET, EXTENDED RELEASE ORAL PRN
Status: DISCONTINUED | OUTPATIENT
Start: 2023-08-31 | End: 2023-09-01 | Stop reason: HOSPADM

## 2023-08-31 RX ADMIN — POTASSIUM CHLORIDE 10 MEQ: 7.46 INJECTION, SOLUTION INTRAVENOUS at 10:39

## 2023-08-31 RX ADMIN — CIPROFLOXACIN 400 MG: 400 INJECTION, SOLUTION INTRAVENOUS at 03:08

## 2023-08-31 RX ADMIN — PANTOPRAZOLE SODIUM 40 MG: 40 TABLET, DELAYED RELEASE ORAL at 05:56

## 2023-08-31 RX ADMIN — POTASSIUM CHLORIDE 10 MEQ: 7.46 INJECTION, SOLUTION INTRAVENOUS at 13:20

## 2023-08-31 RX ADMIN — CIPROFLOXACIN 400 MG: 400 INJECTION, SOLUTION INTRAVENOUS at 17:15

## 2023-08-31 RX ADMIN — POTASSIUM CHLORIDE 10 MEQ: 7.46 INJECTION, SOLUTION INTRAVENOUS at 21:04

## 2023-08-31 RX ADMIN — POTASSIUM CHLORIDE 10 MEQ: 7.46 INJECTION, SOLUTION INTRAVENOUS at 15:30

## 2023-08-31 RX ADMIN — SODIUM CHLORIDE: 4.5 INJECTION, SOLUTION INTRAVENOUS at 10:06

## 2023-08-31 RX ADMIN — ACETAMINOPHEN 650 MG: 325 TABLET ORAL at 21:06

## 2023-08-31 RX ADMIN — POTASSIUM CHLORIDE 10 MEQ: 7.46 INJECTION, SOLUTION INTRAVENOUS at 23:25

## 2023-08-31 RX ADMIN — ONDANSETRON 4 MG: 2 INJECTION INTRAMUSCULAR; INTRAVENOUS at 17:17

## 2023-08-31 RX ADMIN — ONDANSETRON 4 MG: 2 INJECTION INTRAMUSCULAR; INTRAVENOUS at 03:10

## 2023-08-31 RX ADMIN — POTASSIUM CHLORIDE 10 MEQ: 7.46 INJECTION, SOLUTION INTRAVENOUS at 18:20

## 2023-08-31 RX ADMIN — TRAMADOL HYDROCHLORIDE 50 MG: 50 TABLET ORAL at 18:25

## 2023-08-31 RX ADMIN — DIAZEPAM 5 MG: 5 TABLET ORAL at 23:25

## 2023-08-31 RX ADMIN — TRAMADOL HYDROCHLORIDE 50 MG: 50 TABLET ORAL at 03:07

## 2023-08-31 RX ADMIN — DIAZEPAM 5 MG: 5 TABLET ORAL at 11:30

## 2023-08-31 RX ADMIN — TRAMADOL HYDROCHLORIDE 50 MG: 50 TABLET ORAL at 09:40

## 2023-08-31 ASSESSMENT — PAIN SCALES - GENERAL
PAINLEVEL_OUTOF10: 4
PAINLEVEL_OUTOF10: 0
PAINLEVEL_OUTOF10: 3
PAINLEVEL_OUTOF10: 4
PAINLEVEL_OUTOF10: 8
PAINLEVEL_OUTOF10: 5
PAINLEVEL_OUTOF10: 5
PAINLEVEL_OUTOF10: 2
PAINLEVEL_OUTOF10: 5

## 2023-08-31 ASSESSMENT — PAIN - FUNCTIONAL ASSESSMENT
PAIN_FUNCTIONAL_ASSESSMENT: PREVENTS OR INTERFERES SOME ACTIVE ACTIVITIES AND ADLS

## 2023-08-31 ASSESSMENT — PAIN DESCRIPTION - LOCATION
LOCATION: BACK
LOCATION: FLANK

## 2023-08-31 ASSESSMENT — PAIN DESCRIPTION - DESCRIPTORS
DESCRIPTORS: SHARP
DESCRIPTORS: ACHING
DESCRIPTORS: ACHING

## 2023-08-31 ASSESSMENT — PAIN DESCRIPTION - ORIENTATION
ORIENTATION: RIGHT
ORIENTATION: LEFT
ORIENTATION: RIGHT

## 2023-08-31 ASSESSMENT — PAIN DESCRIPTION - ONSET
ONSET: ON-GOING
ONSET: ON-GOING

## 2023-08-31 ASSESSMENT — PAIN DESCRIPTION - FREQUENCY
FREQUENCY: INTERMITTENT
FREQUENCY: INTERMITTENT

## 2023-08-31 ASSESSMENT — PAIN DESCRIPTION - PAIN TYPE
TYPE: ACUTE PAIN
TYPE: ACUTE PAIN

## 2023-08-31 ASSESSMENT — PAIN SCALES - WONG BAKER
WONGBAKER_NUMERICALRESPONSE: 0
WONGBAKER_NUMERICALRESPONSE: 0

## 2023-08-31 NOTE — PLAN OF CARE
Problem: Discharge Planning  Goal: Discharge to home or other facility with appropriate resources  8/31/2023 0957 by Jaswinder Kelley RN  Outcome: Progressing  8/30/2023 2027 by Garcia Clements RN  Outcome: Progressing     Problem: Pain  Goal: Verbalizes/displays adequate comfort level or baseline comfort level  8/31/2023 0957 by Jaswinder Kelley RN  Outcome: Progressing  8/30/2023 2027 by Garcia Clements RN  Outcome: Progressing     Problem: Safety - Adult  Goal: Free from fall injury  8/31/2023 0957 by Jaswinder Kelley RN  Outcome: Progressing  8/30/2023 2027 by Garcia Clements RN  Outcome: Progressing

## 2023-08-31 NOTE — CARE COORDINATION
A quick chart review reveals that a full assessment is not required by case management today. She is from home and in rule out for C diff at this time. She is not on IVAB. There are no disciplines ordered as of yet. We will continue to follow along. Respectfully submitted,    Elisabet AGRAWAL, [facility]  [facility]   [phone]    Electronically signed by TANJA Leonard, LSW on 8/31/2023 at 9:25 AM

## 2023-09-01 VITALS
BODY MASS INDEX: 16.43 KG/M2 | HEART RATE: 67 BPM | WEIGHT: 102.2 LBS | TEMPERATURE: 98.2 F | OXYGEN SATURATION: 98 % | SYSTOLIC BLOOD PRESSURE: 119 MMHG | HEIGHT: 66 IN | RESPIRATION RATE: 15 BRPM | DIASTOLIC BLOOD PRESSURE: 71 MMHG

## 2023-09-01 LAB
ANION GAP SERPL CALCULATED.3IONS-SCNC: 5 MMOL/L (ref 3–16)
BUN SERPL-MCNC: 4 MG/DL (ref 7–20)
CALCIUM SERPL-MCNC: 9.3 MG/DL (ref 8.3–10.6)
CHLORIDE SERPL-SCNC: 107 MMOL/L (ref 99–110)
CO2 SERPL-SCNC: 26 MMOL/L (ref 21–32)
CREAT SERPL-MCNC: 0.6 MG/DL (ref 0.6–1.1)
GFR SERPLBLD CREATININE-BSD FMLA CKD-EPI: >60 ML/MIN/{1.73_M2}
GLUCOSE SERPL-MCNC: 96 MG/DL (ref 70–99)
POTASSIUM SERPL-SCNC: 3.9 MMOL/L (ref 3.5–5.1)
SODIUM SERPL-SCNC: 138 MMOL/L (ref 136–145)

## 2023-09-01 PROCEDURE — 6360000002 HC RX W HCPCS: Performed by: SPECIALIST

## 2023-09-01 PROCEDURE — 94761 N-INVAS EAR/PLS OXIMETRY MLT: CPT

## 2023-09-01 PROCEDURE — 6370000000 HC RX 637 (ALT 250 FOR IP): Performed by: SPECIALIST

## 2023-09-01 PROCEDURE — 36415 COLL VENOUS BLD VENIPUNCTURE: CPT

## 2023-09-01 PROCEDURE — 2580000003 HC RX 258: Performed by: SPECIALIST

## 2023-09-01 PROCEDURE — 80048 BASIC METABOLIC PNL TOTAL CA: CPT

## 2023-09-01 RX ORDER — CIPROFLOXACIN 500 MG/1
500 TABLET, FILM COATED ORAL 2 TIMES DAILY
Qty: 10 TABLET | Refills: 0 | Status: SHIPPED | OUTPATIENT
Start: 2023-09-01 | End: 2023-09-06

## 2023-09-01 RX ADMIN — CIPROFLOXACIN 400 MG: 400 INJECTION, SOLUTION INTRAVENOUS at 15:16

## 2023-09-01 RX ADMIN — PANTOPRAZOLE SODIUM 40 MG: 40 TABLET, DELAYED RELEASE ORAL at 05:55

## 2023-09-01 RX ADMIN — ONDANSETRON 4 MG: 2 INJECTION INTRAMUSCULAR; INTRAVENOUS at 03:28

## 2023-09-01 RX ADMIN — SODIUM CHLORIDE: 4.5 INJECTION, SOLUTION INTRAVENOUS at 00:43

## 2023-09-01 RX ADMIN — ONDANSETRON 4 MG: 2 INJECTION INTRAMUSCULAR; INTRAVENOUS at 15:08

## 2023-09-01 RX ADMIN — DIAZEPAM 5 MG: 5 TABLET ORAL at 11:46

## 2023-09-01 RX ADMIN — CIPROFLOXACIN 400 MG: 400 INJECTION, SOLUTION INTRAVENOUS at 03:19

## 2023-09-01 RX ADMIN — TRAMADOL HYDROCHLORIDE 50 MG: 50 TABLET ORAL at 08:14

## 2023-09-01 ASSESSMENT — PAIN DESCRIPTION - ORIENTATION: ORIENTATION: RIGHT

## 2023-09-01 ASSESSMENT — PAIN DESCRIPTION - PAIN TYPE: TYPE: ACUTE PAIN

## 2023-09-01 ASSESSMENT — PAIN SCALES - GENERAL
PAINLEVEL_OUTOF10: 2
PAINLEVEL_OUTOF10: 5

## 2023-09-01 ASSESSMENT — PAIN - FUNCTIONAL ASSESSMENT: PAIN_FUNCTIONAL_ASSESSMENT: PREVENTS OR INTERFERES SOME ACTIVE ACTIVITIES AND ADLS

## 2023-09-01 ASSESSMENT — PAIN DESCRIPTION - ONSET: ONSET: ON-GOING

## 2023-09-01 ASSESSMENT — PAIN DESCRIPTION - DESCRIPTORS: DESCRIPTORS: ACHING;SHARP

## 2023-09-01 ASSESSMENT — PAIN DESCRIPTION - LOCATION: LOCATION: FLANK

## 2023-09-01 ASSESSMENT — PAIN DESCRIPTION - FREQUENCY: FREQUENCY: CONTINUOUS

## 2023-09-01 NOTE — PROGRESS NOTES
4 Eyes Skin Assessment     NAME:  Sai Lundberg  YOB: 1970  MEDICAL RECORD NUMBER:  [id number]    The patient is being assessed for  Admission    I agree that at least one RN has performed a thorough Head to Toe Skin Assessment on the patient. ALL assessment sites listed below have been assessed. Areas assessed by both nurses:    Head, Face, Ears, Shoulders, Back, Chest, Arms, Elbows, Hands, Sacrum. Buttock, Coccyx, Ischium, Legs. Feet and Heels, and Under Medical Devices         Does the Patient have a Wound?  No noted wound(s)       Biju Prevention initiated by RN: No  Wound Care Orders initiated by RN: No    Pressure Injury (Stage 3,4, Unstageable, DTI, NWPT, and Complex wounds) if present, place Wound referral order by RN under : No    New Ostomies, if present place, Ostomy referral order under : No     Nurse 1 eSignature: Electronically signed by Mehnaz Milton RN on 8/30/23 at 6:38 PM EDT    **SHARE this note so that the co-signing nurse can place an eSignature**    Nurse 2 eSignature: Electronically signed by Vannessa Calloway RN on 8/30/23 at 6:39 PM EDT
Patient alert and oriented x4, VSS, sitting up in bed. Patient independently washed up in bathroom, tolerated well. Patient c/o diarrhea that has improved and right flank pain. PRN pain medication given per request, see MAR. Patient is UAL and tolerating well, non-slip socks on. Bed in lowest and locked position, call light in reach.
Patient verbalized pain at right flank pain 3/10. Tylenol given immediately. Patient verbalized pain when urinating. Encouraged to have adequate fluid intake. Fall preventive measures promoted at all times.     Electronically signed by Jess Almanza RN on 8/31/2023 at 9:18 PM
Pharmacy Note    Iraida Fitzpatrick was ordered azelastine (ASTELIN) 0.1% nasal spray. Per the [address] [place] [address], this medication is non-formulary and not stocked by pharmacy for the reason indicated below. The medication can be reordered at discharge.      Medications in which risks outweigh benefits during hospitalization:         -  oral bisphosphonates         -  raloxifene (Evista)      Medications that lack necessity during an acute hospital stay:      -  nasal antihistamines        -  nasal ipratropium 0.03% and 0.06%        -  nasal miacalcin        -  acyclovir topical cream/ointment orders for herpes labialis (cold sores)    Thank you,  [address] [facility]  8/30/2023, 10:45 AM
Pt educated on discharge instructions and follow-up appointments. Pt states no further questions at this time. Pt IV removed with no complications. Pt transported home by .     Electronically signed by Barak Reed RN on 9/1/2023 at 4:43 PM
Valium dropped when getting medication out of package. Medication wasted with Pierre Adrian RN. New Valium administered per MAR.     Electronically signed by Tomasa Denver, RN on 9/1/2023 at 4:15 PM
 08/27/2023 09:59 AM    K 3.6 08/27/2023 09:59 AM     08/27/2023 09:59 AM    CO2 26 08/27/2023 09:59 AM    BUN 7 08/27/2023 09:59 AM    LABALBU 4.3 08/27/2023 09:59 AM    CREATININE 0.6 08/27/2023 09:59 AM    CALCIUM 9.3 08/27/2023 09:59 AM    GFRAA >60 06/18/2018 03:29 PM    GFRAA >60 10/20/2010 08:50 PM    LABGLOM >60 08/27/2023 09:59 AM    GLUCOSE 101 08/27/2023 09:59 AM       ASSESSMENT AND PLAN      Principal Problem:  Acute pyelonephritis,R side  cont Cipro IV<  R flank pain, d/t Pyelonephritis may take pain meds,prn  E coli UTI  SS to Cipro cont IV antibiotic  f/u labs. PACO cont meds,   GERD cont meds,  L small 2 mm kidney stone w/o hydronephrosis,  Hx of cystitis with hematuria  3 days ago.  Resolved  Dr Serna

## 2023-09-01 NOTE — PLAN OF CARE
Problem: Discharge Planning  Goal: Discharge to home or other facility with appropriate resources  8/31/2023 2118 by Guerda Jose RN  Outcome: Progressing     Problem: Pain  Goal: Verbalizes/displays adequate comfort level or baseline comfort level  8/31/2023 2118 by Guerda Jose RN  Outcome: Progressing     Problem: Safety - Adult  Goal: Free from fall injury  8/31/2023 2118 by Guerda Jose RN  Outcome: Progressing

## 2023-09-01 NOTE — DISCHARGE SUMMARY
[address], [facility] [address]                               DISCHARGE SUMMARY    PATIENT NAME: Reba Spears                    :        1970  MED REC NO:   [id number]                          ROOM:       4264  ACCOUNT NO:   [de-identified]                           ADMIT DATE: 2023  PROVIDER:     Lois Fields MD                  DISCHARGE DATE:  2023    DISCHARGE DIAGNOSES:  Acute pyelonephritis, UTI, E. coli infection,  small left kidney stone, anxiety, GERD, and hypokalemia. DISCHARGE SUMMARY:  This 42-year-old female patient was admitted with  pyelonephritis. The patient has been treated with UTI, culture was  positive for E. coli. The patient's CT scan showed it has some left  small kidney stone and during the hospital stay the patient responded to  antibiotic therapy. It was also noticed that she has hypokalemia, which  was replaced, followup potassium is pending today. If the potassium is  good, then can be discharged today. The patient is afebrile. Her flank  pain has improved significantly. Continue oral antibiotic therapy for 5  more days and we are going to follow-up the kidney stone on an  outpatient basis. CONDITION ON DISCHARGE:  Stable. COMPLICATIONS:  None. DISCHARGE MEDICATIONS:  See the reconciliation sheet. FOLLOWUP:  The patient will follow up with PCP in one week. I spent more than 30 minutes with the discharge instruction and  paperwork.         Lois Fields MD    D: 2023 6:53:34       T: 2023 9:38:43     [email or web address]  Job#: [id number]     Doc#: [id number]    CC:

## 2023-09-03 LAB
BACTERIA BLD CULT ORG #2: NORMAL
BACTERIA BLD CULT: NORMAL

## 2023-11-15 ENCOUNTER — APPOINTMENT (OUTPATIENT)
Dept: GENERAL RADIOLOGY | Age: 53
End: 2023-11-15
Payer: COMMERCIAL

## 2023-11-15 ENCOUNTER — HOSPITAL ENCOUNTER (OUTPATIENT)
Age: 53
Setting detail: OBSERVATION
Discharge: HOME | End: 2023-11-19
Attending: EMERGENCY MEDICINE | Admitting: SPECIALIST
Payer: COMMERCIAL

## 2023-11-15 ENCOUNTER — APPOINTMENT (OUTPATIENT)
Dept: CT IMAGING | Age: 53
End: 2023-11-15
Payer: COMMERCIAL

## 2023-11-15 DIAGNOSIS — R29.898 WEAKNESS OF LEFT LOWER EXTREMITY: Primary | ICD-10-CM

## 2023-11-15 DIAGNOSIS — R42 DIZZINESS: ICD-10-CM

## 2023-11-15 LAB
ALBUMIN SERPL-MCNC: 4.4 G/DL (ref 3.4–5)
ALBUMIN/GLOB SERPL: 2 {RATIO} (ref 1.1–2.2)
ALP SERPL-CCNC: 73 U/L (ref 40–129)
ALT SERPL-CCNC: 10 U/L (ref 10–40)
ANION GAP SERPL CALCULATED.3IONS-SCNC: 8 MMOL/L (ref 3–16)
AST SERPL-CCNC: 17 U/L (ref 15–37)
BILIRUB SERPL-MCNC: 0.4 MG/DL (ref 0–1)
BUN SERPL-MCNC: 6 MG/DL (ref 7–20)
CALCIUM SERPL-MCNC: 9.1 MG/DL (ref 8.3–10.6)
CHLORIDE SERPL-SCNC: 104 MMOL/L (ref 99–110)
CO2 SERPL-SCNC: 28 MMOL/L (ref 21–32)
CREAT SERPL-MCNC: 0.6 MG/DL (ref 0.6–1.1)
GFR SERPLBLD CREATININE-BSD FMLA CKD-EPI: >60 ML/MIN/{1.73_M2}
GLUCOSE BLD-MCNC: 103 MG/DL
GLUCOSE BLD-MCNC: 103 MG/DL (ref 70–99)
GLUCOSE SERPL-MCNC: 99 MG/DL (ref 70–99)
INR PPP: 1 (ref 0.84–1.16)
MAGNESIUM SERPL-MCNC: 1.9 MG/DL (ref 1.8–2.4)
PERFORMED ON: ABNORMAL
POTASSIUM SERPL-SCNC: 3.1 MMOL/L (ref 3.5–5.1)
PROT SERPL-MCNC: 6.6 G/DL (ref 6.4–8.2)
PROTHROMBIN TIME: 13.2 SEC (ref 11.5–14.8)
SODIUM SERPL-SCNC: 140 MMOL/L (ref 136–145)
TROPONIN, HIGH SENSITIVITY: <6 NG/L (ref 0–14)

## 2023-11-15 PROCEDURE — G0378 HOSPITAL OBSERVATION PER HR: HCPCS

## 2023-11-15 PROCEDURE — 70498 CT ANGIOGRAPHY NECK: CPT

## 2023-11-15 PROCEDURE — 85025 COMPLETE CBC W/AUTO DIFF WBC: CPT

## 2023-11-15 PROCEDURE — 6360000004 HC RX CONTRAST MEDICATION: Performed by: EMERGENCY MEDICINE

## 2023-11-15 PROCEDURE — 70450 CT HEAD/BRAIN W/O DYE: CPT

## 2023-11-15 PROCEDURE — 99285 EMERGENCY DEPT VISIT HI MDM: CPT

## 2023-11-15 PROCEDURE — 6370000000 HC RX 637 (ALT 250 FOR IP): Performed by: EMERGENCY MEDICINE

## 2023-11-15 PROCEDURE — 84484 ASSAY OF TROPONIN QUANT: CPT

## 2023-11-15 PROCEDURE — 71045 X-RAY EXAM CHEST 1 VIEW: CPT

## 2023-11-15 PROCEDURE — 85610 PROTHROMBIN TIME: CPT

## 2023-11-15 PROCEDURE — 83735 ASSAY OF MAGNESIUM: CPT

## 2023-11-15 PROCEDURE — 93005 ELECTROCARDIOGRAM TRACING: CPT | Performed by: EMERGENCY MEDICINE

## 2023-11-15 PROCEDURE — 80053 COMPREHEN METABOLIC PANEL: CPT

## 2023-11-15 RX ORDER — PANTOPRAZOLE SODIUM 40 MG/1
40 TABLET, DELAYED RELEASE ORAL
Status: DISCONTINUED | OUTPATIENT
Start: 2023-11-16 | End: 2023-11-19 | Stop reason: HOSPADM

## 2023-11-15 RX ORDER — DIAZEPAM 5 MG/1
5 TABLET ORAL EVERY 12 HOURS PRN
Status: DISCONTINUED | OUTPATIENT
Start: 2023-11-15 | End: 2023-11-19 | Stop reason: HOSPADM

## 2023-11-15 RX ORDER — POTASSIUM CHLORIDE 750 MG/1
40 TABLET, FILM COATED, EXTENDED RELEASE ORAL ONCE
Status: COMPLETED | OUTPATIENT
Start: 2023-11-15 | End: 2023-11-15

## 2023-11-15 RX ORDER — OMEPRAZOLE 20 MG/1
20 CAPSULE, DELAYED RELEASE ORAL 2 TIMES DAILY
COMMUNITY

## 2023-11-15 RX ORDER — CETIRIZINE HYDROCHLORIDE 10 MG/1
5 TABLET ORAL DAILY
Status: DISCONTINUED | OUTPATIENT
Start: 2023-11-16 | End: 2023-11-19 | Stop reason: HOSPADM

## 2023-11-15 RX ORDER — ASPIRIN 81 MG/1
324 TABLET, CHEWABLE ORAL ONCE
Status: COMPLETED | OUTPATIENT
Start: 2023-11-15 | End: 2023-11-15

## 2023-11-15 RX ADMIN — ASPIRIN 324 MG: 81 TABLET, CHEWABLE ORAL at 19:40

## 2023-11-15 RX ADMIN — IOPAMIDOL 75 ML: 755 INJECTION, SOLUTION INTRAVENOUS at 18:14

## 2023-11-15 RX ADMIN — POTASSIUM CHLORIDE 40 MEQ: 750 TABLET, FILM COATED, EXTENDED RELEASE ORAL at 22:13

## 2023-11-15 ASSESSMENT — PAIN SCALES - GENERAL
PAINLEVEL_OUTOF10: 2
PAINLEVEL_OUTOF10: 0

## 2023-11-15 ASSESSMENT — PAIN DESCRIPTION - DESCRIPTORS: DESCRIPTORS: NUMBNESS

## 2023-11-15 ASSESSMENT — PAIN DESCRIPTION - PAIN TYPE: TYPE: ACUTE PAIN

## 2023-11-15 ASSESSMENT — PAIN DESCRIPTION - ORIENTATION: ORIENTATION: LEFT

## 2023-11-15 ASSESSMENT — PAIN DESCRIPTION - LOCATION: LOCATION: LEG

## 2023-11-15 NOTE — ED PROVIDER NOTES
[facility] EMERGENCY DEPARTMENT    CHIEF COMPLAINT  Leg Swelling (Patient reports left leg swelling with toes numb since last night. ) and Dizziness (Reports dizziness at 3pm today. )       HISTORY OF PRESENT ILLNESS  Akira Santos is a 48 y.o. female who presents to the ED with complaint of \"I just don't feel well. \" Patient reports that last night around , she had some numbness affecting her toes. Over the course of the day, she feels increased numbness extending proximally all the way up the extremity. She feels like the LLE is slightly weak compared to the right. Patient developed dizziness that started this afternoon around 1500. The dizziness is described as a lightheadedness. Not associated with postural change. Comes and goes. Denies fever, chest pain, SOB, nausea, vomiting, diarrhea. The patient feels like she has swelling of the LLE. I have reviewed the following from the nursing documentation:    Past Medical History:   Diagnosis Date    Anxiety     GERD (gastroesophageal reflux disease)     Kidney stones      Past Surgical History:   Procedure Laterality Date     SECTION      ENDOSCOPY, COLON, DIAGNOSTIC      TUBAL LIGATION      UPPER GASTROINTESTINAL ENDOSCOPY N/A 2022    EGD ESOPHAGOGASTRODUODENOSCOPY performed by Pradeep Alvares MD at [address] [place] [address]     History reviewed. No pertinent family history. Social History     Socioeconomic History    Marital status:      Spouse name: Not on file    Number of children: Not on file    Years of education: Not on file    Highest education level: Not on file   Occupational History    Not on file   Tobacco Use    Smoking status: Every Day     Packs/day: 0.50     Years: 30.00     Additional pack years: 0.00     Total pack years: 15.00     Types: Cigarettes    Smokeless tobacco: Never   Vaping Use    Vaping Use: Some days   Substance and Sexual Activity    Alcohol use: Yes     Comment: rarely    Drug use:  Yes

## 2023-11-16 PROBLEM — E43 SEVERE MALNUTRITION (HCC): Chronic | Status: ACTIVE | Noted: 2023-11-16

## 2023-11-16 LAB
BASOPHILS # BLD: 0 K/UL (ref 0–0.2)
BASOPHILS NFR BLD: 0 %
DEPRECATED RDW RBC AUTO: 12.6 % (ref 12.4–15.4)
EKG ATRIAL RATE: 61 BPM
EKG DIAGNOSIS: NORMAL
EKG P AXIS: 71 DEGREES
EKG P-R INTERVAL: 126 MS
EKG Q-T INTERVAL: 442 MS
EKG QRS DURATION: 86 MS
EKG QTC CALCULATION (BAZETT): 444 MS
EKG R AXIS: 75 DEGREES
EKG T AXIS: 73 DEGREES
EKG VENTRICULAR RATE: 61 BPM
EOSINOPHIL # BLD: 0 K/UL (ref 0–0.6)
EOSINOPHIL NFR BLD: 0 %
HCT VFR BLD AUTO: 33.7 % (ref 36–48)
HGB BLD-MCNC: 11.9 G/DL (ref 12–16)
LYMPHOCYTES # BLD: 5.1 K/UL (ref 1–5.1)
LYMPHOCYTES NFR BLD: 67 %
MCH RBC QN AUTO: 33.2 PG (ref 26–34)
MCHC RBC AUTO-ENTMCNC: 35.2 G/DL (ref 31–36)
MCV RBC AUTO: 94.4 FL (ref 80–100)
MONOCYTES # BLD: 0.2 K/UL (ref 0–1.3)
MONOCYTES NFR BLD: 2 %
NEUTROPHILS # BLD: 2.4 K/UL (ref 1.7–7.7)
NEUTROPHILS NFR BLD: 31 %
PATH INTERP BLD-IMP: NORMAL
PATH INTERP BLD-IMP: YES
PLATELET # BLD AUTO: 182 K/UL (ref 135–450)
PLATELET BLD QL SMEAR: ADEQUATE
PMV BLD AUTO: 9.2 FL (ref 5–10.5)
RBC # BLD AUTO: 3.57 M/UL (ref 4–5.2)
SLIDE REVIEW: ABNORMAL
WBC # BLD AUTO: 7.6 K/UL (ref 4–11)

## 2023-11-16 PROCEDURE — 94760 N-INVAS EAR/PLS OXIMETRY 1: CPT

## 2023-11-16 PROCEDURE — G0378 HOSPITAL OBSERVATION PER HR: HCPCS

## 2023-11-16 PROCEDURE — 93010 ELECTROCARDIOGRAM REPORT: CPT | Performed by: INTERNAL MEDICINE

## 2023-11-16 PROCEDURE — 6370000000 HC RX 637 (ALT 250 FOR IP): Performed by: SPECIALIST

## 2023-11-16 RX ORDER — NICOTINE 21 MG/24HR
1 PATCH, TRANSDERMAL 24 HOURS TRANSDERMAL DAILY
Status: DISCONTINUED | OUTPATIENT
Start: 2023-11-16 | End: 2023-11-19 | Stop reason: HOSPADM

## 2023-11-16 RX ORDER — POTASSIUM CHLORIDE 750 MG/1
10 TABLET, FILM COATED, EXTENDED RELEASE ORAL DAILY
Status: DISCONTINUED | OUTPATIENT
Start: 2023-11-16 | End: 2023-11-19 | Stop reason: HOSPADM

## 2023-11-16 RX ADMIN — POTASSIUM CHLORIDE 10 MEQ: 750 TABLET, FILM COATED, EXTENDED RELEASE ORAL at 09:14

## 2023-11-16 RX ADMIN — DIAZEPAM 5 MG: 5 TABLET ORAL at 15:16

## 2023-11-16 RX ADMIN — PANTOPRAZOLE SODIUM 40 MG: 40 TABLET, DELAYED RELEASE ORAL at 05:42

## 2023-11-16 ASSESSMENT — PAIN SCALES - GENERAL: PAINLEVEL_OUTOF10: 0

## 2023-11-16 NOTE — H&P
H & P dictated  [phone]  Acute L sided paresthesia, weakness on L leg,  Hypokalemia,  PACO  GERD  CT scan was unremarkable, get neruo consult, MRI,  Resume home meds,[facility]  Dr Mccallum Coad
1928    cetirizine (ZYRTEC) tablet 5 mg  5 mg Oral Daily Iliana Barbosa MD   5 mg at 11/18/23 0842    pantoprazole (PROTONIX) tablet 40 mg  40 mg Oral BIB Campos MD   40 mg at 11/18/23 0650      ALLERGIES:  SULFA. REVIEW OF SYSTEMS:  Some left-sided headache. No visual symptoms. No  swallowing problem. No ear pain. No chest pain. No palpitation. No  cough. No pleuritic pain. No nausea, vomiting, or abdominal pain. Left-sided weakness on the left side. The patient has had chronic  anxiety. PHYSICAL EXAMINATION:  GENERAL:  The patient is alert, oriented, well-developed,  well-nourished, small-framed, not in apparent distress. VITAL SIGNS:  The patient's blood pressure was 132/83, respiratory rate  was 16, pulse 67, temperature 97.9. The patient weighed 46 kg. The  patient's saturation was 99% on room air. HEENT:  Head is normocephalic and atraumatic. Pupils are equal on both  sides, reactive to light and accommodation. Ears, Nose, and Throat: Within normal limits. Mucous membranes moist.  NECK:  Supple. No JVD. No lymphadenopathy. No thyromegaly. CHEST:  Lungs clear bilaterally. No wheezing. CARDIOVASCULAR:  Heart S1 and S2.  Regular rhythm. ABDOMEN:  Soft. Bowel sounds present. No hepatosplenomegaly. EXTREMITIES:  No edema. No cyanosis. No clubbing. NEUROLOGIC:  Some left facial paresthesia and left leg paresthesia. Motor strength equal on both sides both upper and lower extremities. SKIN:  Warm and dry. LABORATORY STUDIES:  WBC count was 7.6, hemoglobin 11.9, hematocrit 33.7  and platelet count 782. Sodium was 140, potassium 3.1, chloride 104,  CO2 is 28, BUN 6, creatinine 0.6, glucose was 99. LFTs are normal.    CT scan was unremarkable. Chest x-ray was also negative. ASSESSMENT:  Left-sided weakness, headache, anxiety, mild hypokalemia,  GERD.     PLAN:  The patient is going to schedule for MRI, get neuro consult,  resume home medication, and

## 2023-11-16 NOTE — CARE COORDINATION
Case Management Assessment  Initial Evaluation    Date/Time of Evaluation: 11/16/2023 12:16 PM  Assessment Completed by: TANJA Reece, MARISELW    If patient is discharged prior to next notation, then this note serves as note for discharge by case management. Patient Name: Ricardo Henry                   YOB: 1970  Diagnosis: Dizziness [R42]  Weakness of left lower extremity [R29.898]  Complaints of weakness of lower extremity [R29.898]                   Date / Time: 11/15/2023  5:37 PM    Patient Admission Status: Observation   Readmission Risk (Low < 19, Mod (19-27), High > 27): Readmission Risk Score: 7.4    Current PCP: Reyes Caper, MD  PCP verified by CM? Yes    Chart Reviewed: Yes      History Provided by: Patient  Patient Orientation: Alert and Oriented    Patient Cognition: Alert    Hospitalization in the last 30 days (Readmission):  No    If yes, Readmission Assessment in  Navigator will be completed. Advance Directives:      Code Status: Prior   Patient's Primary Decision Maker is: Legal Next of Kin      Discharge Planning:    Patient lives with: Spouse/Significant Other, Children, Other (Comment) (pets, 2 dogs and one cat.) Type of Home: House  Primary Care Giver: Self  Patient Support Systems include: Other (Comment), Spouse/Significant Other, Children (pets, 2 dogs and one cat.)   Current Financial resources: None  Current community resources: None  Current services prior to admission: None            Current DME:              Type of Home Care services:  None    ADLS  Prior functional level: Independent in ADLs/IADLs  Current functional level: Independent in ADLs/IADLs    PT AM-PAC:   /24  OT AM-PAC:   /24    Family can provide assistance at DC: Yes  Would you like Case Management to discuss the discharge plan with any other family members/significant others, and if so, who?  No  Plans to Return to Present Housing: Yes  Other Identified Issues/Barriers to RETURNING to

## 2023-11-16 NOTE — ED TRIAGE NOTES
Patient to ED via private car ambulating to triage. Patient states she has left leg swelling with numbness in toes since last night. Patient reports working today. Patient went home and then became dizzy at 3pm today. Difficulty walking due to dizziness.

## 2023-11-16 NOTE — CONSULTS
Neurology Consult Note  Reason for Consult: rule out TIA or CVA    Chief complaint: left leg weakness, weird feeling on L side of body    Dr Belen Guan MD asked me to see Jose Mejia in consultation for evaluation of rule out TIA or CVA    History of Present Illness:  Jose Mejia is a 48 y.o. female who presents with weakness and odd feeling on her L side. I obtained my information via interview w/ the patient, supplemented by chart review. The patient says that around 1900 on Tuesday she laid down for bed (gets up early for work) and noticed that toes 3-5 on her left foot felt numb. Yesterday she was having trouble moving the same toes and the numbness went up into her ankle on the outside some. She felt like she was having swelling of her LLE all the way up to her waist.  She started having some intermittent lightheaded and dizziness, seemingly most often w/ position changes. Later on yesterday she says that it became apparent to her that her entire L side (head to toe) felt strange. Initial BP here was 134/70. CT head and CTA head/neck were negative. Currently she says she continues to have LLE weakness and paresthesias and her L side feels odd. No new back pain. She had a UTI a couple of months ago and attributed the sharp pans near her kidney to that. No urinary or bowel incontinence. No trauma. She does have a hx of migraines though these have not been problematic recently. Her last one was about a year ago. Usually they would be so bad that she would vomit and have to sit in a dark room. She would have some dizziness w/ her headaches though never any other focal neurologic symptoms. She did mention concern about L ear pain that she has been having. She stopped smoking about 2 weeks ago. Also reported about a 40 pound unintentional weight loss over the last 1.5 years.       Medical History:  Past Medical History:   Diagnosis Date    Anxiety

## 2023-11-17 ENCOUNTER — APPOINTMENT (OUTPATIENT)
Dept: MRI IMAGING | Age: 53
End: 2023-11-17
Payer: COMMERCIAL

## 2023-11-17 PROCEDURE — 6370000000 HC RX 637 (ALT 250 FOR IP): Performed by: SPECIALIST

## 2023-11-17 PROCEDURE — G0378 HOSPITAL OBSERVATION PER HR: HCPCS

## 2023-11-17 PROCEDURE — 72148 MRI LUMBAR SPINE W/O DYE: CPT

## 2023-11-17 PROCEDURE — 70551 MRI BRAIN STEM W/O DYE: CPT

## 2023-11-17 PROCEDURE — 94760 N-INVAS EAR/PLS OXIMETRY 1: CPT

## 2023-11-17 RX ADMIN — PANTOPRAZOLE SODIUM 40 MG: 40 TABLET, DELAYED RELEASE ORAL at 05:48

## 2023-11-17 RX ADMIN — CETIRIZINE HYDROCHLORIDE 5 MG: 10 TABLET ORAL at 08:42

## 2023-11-17 RX ADMIN — POTASSIUM CHLORIDE 10 MEQ: 750 TABLET, FILM COATED, EXTENDED RELEASE ORAL at 08:42

## 2023-11-17 RX ADMIN — DIAZEPAM 5 MG: 5 TABLET ORAL at 19:47

## 2023-11-17 RX ADMIN — DIAZEPAM 5 MG: 5 TABLET ORAL at 08:42

## 2023-11-17 NOTE — PLAN OF CARE
Problem: Discharge Planning  Goal: Discharge to home or other facility with appropriate resources  Outcome: Progressing  Flowsheets (Taken 11/16/2023 0245 by Judith Torres RN)  Discharge to home or other facility with appropriate resources: Identify barriers to discharge with patient and caregiver     Problem: Pain  Goal: Verbalizes/displays adequate comfort level or baseline comfort level  Outcome: Progressing     Problem: Safety - Adult  Goal: Free from fall injury  Outcome: Progressing     Problem: Nutrition Deficit:  Goal: Optimize nutritional status  Outcome: Progressing

## 2023-11-17 NOTE — PLAN OF CARE
Problem: Discharge Planning  Goal: Discharge to home or other facility with appropriate resources  Outcome: Progressing  Flowsheets (Taken 11/17/2023 0800)  Discharge to home or other facility with appropriate resources: Identify barriers to discharge with patient and caregiver     Problem: Pain  Goal: Verbalizes/displays adequate comfort level or baseline comfort level  Outcome: Progressing     Problem: Safety - Adult  Goal: Free from fall injury  Outcome: Progressing     Problem: Nutrition Deficit:  Goal: Optimize nutritional status  Outcome: Progressing     Problem: Neurosensory - Adult  Goal: Achieves maximal functionality and self care  Outcome: Progressing  Flowsheets (Taken 11/17/2023 0800)  Achieves maximal functionality and self care: Monitor swallowing and airway patency with patient fatigue and changes in neurological status     Problem: Musculoskeletal - Adult  Goal: Return mobility to safest level of function  Outcome: Progressing  Flowsheets (Taken 11/17/2023 0800)  Return Mobility to Safest Level of Function: Assess patient stability and activity tolerance for standing, transferring and ambulating with or without assistive devices     Problem: Anxiety  Goal: Will report anxiety at manageable levels  Description: INTERVENTIONS:  1. Administer medication as ordered  2. Teach and rehearse alternative coping skills  3.  Provide emotional support with 1:1 interaction with staff  Outcome: Progressing  Flowsheets (Taken 11/17/2023 0800)  Will report anxiety at manageable levels: Administer medication as ordered

## 2023-11-18 ENCOUNTER — APPOINTMENT (OUTPATIENT)
Dept: MRI IMAGING | Age: 53
End: 2023-11-18
Payer: COMMERCIAL

## 2023-11-18 PROCEDURE — 6370000000 HC RX 637 (ALT 250 FOR IP): Performed by: SPECIALIST

## 2023-11-18 PROCEDURE — 94760 N-INVAS EAR/PLS OXIMETRY 1: CPT

## 2023-11-18 PROCEDURE — G0378 HOSPITAL OBSERVATION PER HR: HCPCS

## 2023-11-18 PROCEDURE — 72146 MRI CHEST SPINE W/O DYE: CPT

## 2023-11-18 PROCEDURE — 72141 MRI NECK SPINE W/O DYE: CPT

## 2023-11-18 RX ADMIN — DIAZEPAM 5 MG: 5 TABLET ORAL at 08:42

## 2023-11-18 RX ADMIN — PANTOPRAZOLE SODIUM 40 MG: 40 TABLET, DELAYED RELEASE ORAL at 06:50

## 2023-11-18 RX ADMIN — CETIRIZINE HYDROCHLORIDE 5 MG: 10 TABLET ORAL at 08:42

## 2023-11-18 RX ADMIN — DIAZEPAM 5 MG: 5 TABLET ORAL at 19:28

## 2023-11-18 RX ADMIN — POTASSIUM CHLORIDE 10 MEQ: 750 TABLET, FILM COATED, EXTENDED RELEASE ORAL at 08:42

## 2023-11-18 ASSESSMENT — PAIN SCALES - GENERAL
PAINLEVEL_OUTOF10: 0
PAINLEVEL_OUTOF10: 0

## 2023-11-18 NOTE — PLAN OF CARE
Problem: Discharge Planning  Goal: Discharge to home or other facility with appropriate resources  11/18/2023 0124 by Cary Zelaya RN  Outcome: Progressing  11/17/2023 1216 by Zulema Lomax RN  Outcome: Progressing  Flowsheets (Taken 11/17/2023 0800)  Discharge to home or other facility with appropriate resources: Identify barriers to discharge with patient and caregiver     Problem: Pain  Goal: Verbalizes/displays adequate comfort level or baseline comfort level  11/18/2023 0124 by Cary Zelaya RN  Outcome: Progressing  Flowsheets (Taken 11/17/2023 1650 by Zulema Lomax RN)  Verbalizes/displays adequate comfort level or baseline comfort level: Encourage patient to monitor pain and request assistance  11/17/2023 1216 by Zulema Lomax RN  Outcome: Progressing     Problem: Safety - Adult  Goal: Free from fall injury  11/18/2023 0124 by Cary Zelaya RN  Outcome: Progressing  11/17/2023 1216 by Zulema Lomax RN  Outcome: Progressing     Problem: Nutrition Deficit:  Goal: Optimize nutritional status  11/18/2023 0124 by Cary Zelaya RN  Outcome: Progressing  11/17/2023 1216 by Zulema Lomax RN  Outcome: Progressing     Problem: Neurosensory - Adult  Goal: Achieves maximal functionality and self care  11/18/2023 0124 by Cary Zelaya RN  Outcome: Progressing  11/17/2023 1216 by Zulema Lomax RN  Outcome: Progressing  Flowsheets (Taken 11/17/2023 0800)  Achieves maximal functionality and self care: Monitor swallowing and airway patency with patient fatigue and changes in neurological status     Problem: Musculoskeletal - Adult  Goal: Return mobility to safest level of function  11/18/2023 0124 by Cary Zelaya RN  Outcome: Progressing  11/17/2023 1216 by Zulema Lomax RN  Outcome: Progressing  Flowsheets (Taken 11/17/2023 0800)  Return Mobility to Safest Level of Function: Assess patient stability and activity tolerance for standing, transferring

## 2023-11-19 VITALS
WEIGHT: 98.11 LBS | SYSTOLIC BLOOD PRESSURE: 123 MMHG | OXYGEN SATURATION: 99 % | RESPIRATION RATE: 16 BRPM | DIASTOLIC BLOOD PRESSURE: 72 MMHG | TEMPERATURE: 97.3 F | HEART RATE: 78 BPM | BODY MASS INDEX: 15.77 KG/M2 | HEIGHT: 66 IN

## 2023-11-19 PROCEDURE — G0378 HOSPITAL OBSERVATION PER HR: HCPCS

## 2023-11-19 PROCEDURE — 6370000000 HC RX 637 (ALT 250 FOR IP): Performed by: SPECIALIST

## 2023-11-19 PROCEDURE — 94760 N-INVAS EAR/PLS OXIMETRY 1: CPT

## 2023-11-19 RX ORDER — POTASSIUM CHLORIDE 750 MG/1
10 TABLET, FILM COATED, EXTENDED RELEASE ORAL DAILY
Qty: 60 TABLET | Refills: 3 | Status: SHIPPED | OUTPATIENT
Start: 2023-11-19

## 2023-11-19 RX ORDER — CETIRIZINE HYDROCHLORIDE 5 MG/1
5 TABLET ORAL DAILY
Qty: 30 TABLET | Refills: 1 | Status: SHIPPED | OUTPATIENT
Start: 2023-11-19

## 2023-11-19 RX ORDER — NICOTINE 21 MG/24HR
1 PATCH, TRANSDERMAL 24 HOURS TRANSDERMAL DAILY
Qty: 30 PATCH | Refills: 3 | Status: SHIPPED | OUTPATIENT
Start: 2023-11-19

## 2023-11-19 RX ADMIN — POTASSIUM CHLORIDE 10 MEQ: 750 TABLET, FILM COATED, EXTENDED RELEASE ORAL at 07:51

## 2023-11-19 RX ADMIN — CETIRIZINE HYDROCHLORIDE 5 MG: 10 TABLET ORAL at 07:51

## 2023-11-19 RX ADMIN — DIAZEPAM 5 MG: 5 TABLET ORAL at 07:51

## 2023-11-19 ASSESSMENT — PAIN SCALES - GENERAL: PAINLEVEL_OUTOF10: 0

## 2023-11-19 NOTE — DISCHARGE SUMMARY
[address], [address]                               DISCHARGE SUMMARY    PATIENT NAME: Nick Juan                    :        1970  MED REC NO:   [id number]                          ROOM:       0740  ACCOUNT NO:   [de-identified]                           ADMIT DATE: 11/15/2023  PROVIDER:     River Slaughter MD                  DISCHARGE DATE:  2023    DISCHARGE DIAGNOSES:  Left-sided weakness, headache, history of  migraine, anxiety, hypokalemia, and GERD. No specific microvascular  changes in the brain. DISCHARGE SUMMARY:  This 70-year-old female patient who came to  emergency room because she had some problems with left-sided leg, some  kind of numbness and drifting, had some neuropathy-like pain and then  the headache. There was no facial asymmetry and the patient was able to  walk, however, she was feeling weak and tired. The patient thought that  the patient might have some mini-stroke. MRI was done and showed  nonspecific microvascular changes in the brain. No stroke. The patient  also had a subsequent cervical and thoracic and lumbar MRI. The  cervical and thoracic was fine. The lumbar showed some minimal  degenerative joint disease. The patient started on PT/OT. She was seen  by Neurology and at the present time there is no further treatment is  necessary. Just continue home PT/OT and the patient will follow up on  an outpatient basis. CONDITION ON DISCHARGE:  Stable. COMPLICATIONS:  None. DISCHARGE MEDICATIONS:  See the reconciliation sheet. FOLLOWUP:  The patient will follow up with PCP in one or two weeks after  discharge. I spent more than 30 minutes with the discharge instruction and  paperwork.         River Slaughter MD    D: 2023 7:15:51       T: 2023 8:18:16     [email or web address]  Job#: [id number]     Doc#: [id number]    CC:

## 2023-11-19 NOTE — CARE COORDINATION
Case Management Discharge Note          Date / Time of Note: 11/19/2023 10:13 AM                  Patient Name: Iraida Fitzpatrick   YOB: 1970  Diagnosis: Dizziness [R42]  Weakness of left lower extremity [R29.898]  Complaints of weakness of lower extremity [R29.898]   Date / Time: 11/15/2023  5:37 PM    Financial:  Payor: [employer] / Plan: [employer] / Product Type: *No Product type* /      Pharmacy:    [facility] [id number] [address] [phone] - F [phone]  [address] [place] [address]  Phone: [phone] Fax: [phone]      Assistance purchasing medications?: Potential Assistance Purchasing Medications: No  Assistance provided by Case Management: None at this time    DISCHARGE Disposition: Home- No Services Needed        Transportation:  Transportation PLAN for discharge: Family  Mode of Transport: Private Car    Additional CM Notes: Pt will return home with family at discharge. Family to transport. Pt reports having no additional needs at this time.      The Plan for Transition of Care is related to the following treatment goals of Dizziness [R42]  Weakness of left lower extremity [R29.898]  Complaints of weakness of lower extremity [R29.898]    Gumaro Adler, [address]   Case Management Department  Ph: [phone]

## 2023-11-19 NOTE — DISCHARGE INSTR - DIET
Good nutrition is important when healing from an illness, injury, or surgery. Follow any nutrition recommendations given to you during your hospital stay. If you were given an oral nutrition supplement while in the hospital, continue to take this supplement at home. You can take it with meals, in-between meals, and/or before bedtime. These supplements can be purchased at most local grocery stores, pharmacies, and chain [employer]-stores. If you have any questions about your diet or nutrition, call the hospital and ask for the dietitian.     Regular Diet as tolerated

## 2023-11-19 NOTE — PLAN OF CARE
Problem: Discharge Planning  Goal: Discharge to home or other facility with appropriate resources  Outcome: Progressing  Flowsheets (Taken 11/18/2023 0840 by Moustapha Newton RN)  Discharge to home or other facility with appropriate resources: Identify barriers to discharge with patient and caregiver     Problem: Pain  Goal: Verbalizes/displays adequate comfort level or baseline comfort level  Outcome: Progressing     Problem: Safety - Adult  Goal: Free from fall injury  Outcome: Progressing     Problem: Nutrition Deficit:  Goal: Optimize nutritional status  Outcome: Progressing     Problem: Neurosensory - Adult  Goal: Achieves maximal functionality and self care  Outcome: Progressing     Problem: Musculoskeletal - Adult  Goal: Return mobility to safest level of function  Outcome: Progressing  Flowsheets (Taken 11/18/2023 0840 by Moustapha Newton RN)  Return Mobility to Safest Level of Function: Assess patient stability and activity tolerance for standing, transferring and ambulating with or without assistive devices     Problem: Anxiety  Goal: Will report anxiety at manageable levels  Description: INTERVENTIONS:  1. Administer medication as ordered  2. Teach and rehearse alternative coping skills  3.  Provide emotional support with 1:1 interaction with staff  Outcome: Progressing

## 2024-05-04 ENCOUNTER — HOSPITAL ENCOUNTER (EMERGENCY)
Age: 54
Discharge: HOME | End: 2024-05-04
Payer: COMMERCIAL

## 2024-05-04 ENCOUNTER — APPOINTMENT (OUTPATIENT)
Dept: CT IMAGING | Age: 54
End: 2024-05-04
Payer: COMMERCIAL

## 2024-05-04 VITALS
TEMPERATURE: 97.7 F | HEIGHT: 66 IN | RESPIRATION RATE: 16 BRPM | DIASTOLIC BLOOD PRESSURE: 79 MMHG | BODY MASS INDEX: 18.25 KG/M2 | OXYGEN SATURATION: 100 % | WEIGHT: 113.54 LBS | HEART RATE: 82 BPM | SYSTOLIC BLOOD PRESSURE: 111 MMHG

## 2024-05-04 DIAGNOSIS — R31.0 GROSS HEMATURIA: ICD-10-CM

## 2024-05-04 DIAGNOSIS — N20.0 KIDNEY STONE: Primary | ICD-10-CM

## 2024-05-04 LAB
ALBUMIN SERPL-MCNC: 4.5 G/DL (ref 3.4–5)
ALBUMIN/GLOB SERPL: 1.4 {RATIO} (ref 1.1–2.2)
ALP SERPL-CCNC: 97 U/L (ref 40–129)
ALT SERPL-CCNC: 8 U/L (ref 10–40)
ANION GAP SERPL CALCULATED.3IONS-SCNC: 10 MMOL/L (ref 3–16)
AST SERPL-CCNC: 19 U/L (ref 15–37)
BACTERIA URNS QL MICRO: ABNORMAL /HPF
BASOPHILS # BLD: 0 K/UL (ref 0–0.2)
BASOPHILS NFR BLD: 0.3 %
BILIRUB SERPL-MCNC: 0.4 MG/DL (ref 0–1)
BILIRUB UR QL STRIP.AUTO: NEGATIVE
BUN SERPL-MCNC: 9 MG/DL (ref 7–20)
CALCIUM SERPL-MCNC: 9.5 MG/DL (ref 8.3–10.6)
CHLORIDE SERPL-SCNC: 105 MMOL/L (ref 99–110)
CLARITY UR: CLEAR
CO2 SERPL-SCNC: 26 MMOL/L (ref 21–32)
COLOR UR: YELLOW
CREAT SERPL-MCNC: <0.5 MG/DL (ref 0.6–1.1)
DEPRECATED RDW RBC AUTO: 12.6 % (ref 12.4–15.4)
EOSINOPHIL # BLD: 0.1 K/UL (ref 0–0.6)
EOSINOPHIL NFR BLD: 0.9 %
EPI CELLS #/AREA URNS AUTO: 1 /HPF (ref 0–5)
GFR SERPLBLD CREATININE-BSD FMLA CKD-EPI: >90 ML/MIN/{1.73_M2}
GLUCOSE SERPL-MCNC: 105 MG/DL (ref 70–99)
GLUCOSE UR STRIP.AUTO-MCNC: NEGATIVE MG/DL
HCT VFR BLD AUTO: 38.5 % (ref 36–48)
HEMOCCULT STL QL: NORMAL
HGB BLD-MCNC: 13.3 G/DL (ref 12–16)
HGB UR QL STRIP.AUTO: ABNORMAL
HYALINE CASTS #/AREA URNS AUTO: 0 /LPF (ref 0–8)
KETONES UR STRIP.AUTO-MCNC: NEGATIVE MG/DL
LACTATE BLDV-SCNC: 0.8 MMOL/L (ref 0.4–1.9)
LEUKOCYTE ESTERASE UR QL STRIP.AUTO: ABNORMAL
LYMPHOCYTES # BLD: 3.8 K/UL (ref 1–5.1)
LYMPHOCYTES NFR BLD: 46.1 %
MCH RBC QN AUTO: 31.9 PG (ref 26–34)
MCHC RBC AUTO-ENTMCNC: 34.5 G/DL (ref 31–36)
MCV RBC AUTO: 92.6 FL (ref 80–100)
MONOCYTES # BLD: 0.4 K/UL (ref 0–1.3)
MONOCYTES NFR BLD: 4.8 %
NEUTROPHILS # BLD: 4 K/UL (ref 1.7–7.7)
NEUTROPHILS NFR BLD: 47.9 %
NITRITE UR QL STRIP.AUTO: NEGATIVE
PH UR STRIP.AUTO: 7 [PH] (ref 5–8)
PLATELET # BLD AUTO: 258 K/UL (ref 135–450)
PMV BLD AUTO: 8.7 FL (ref 5–10.5)
POTASSIUM SERPL-SCNC: 3.9 MMOL/L (ref 3.5–5.1)
PROT SERPL-MCNC: 7.7 G/DL (ref 6.4–8.2)
PROT UR STRIP.AUTO-MCNC: NEGATIVE MG/DL
RBC # BLD AUTO: 4.16 M/UL (ref 4–5.2)
RBC CLUMPS #/AREA URNS AUTO: 33 /HPF (ref 0–4)
SODIUM SERPL-SCNC: 141 MMOL/L (ref 136–145)
SP GR UR STRIP.AUTO: 1.01 (ref 1–1.03)
UA COMPLETE W REFLEX CULTURE PNL UR: ABNORMAL
UA DIPSTICK W REFLEX MICRO PNL UR: YES
URN SPEC COLLECT METH UR: ABNORMAL
UROBILINOGEN UR STRIP-ACNC: 0.2 E.U./DL
WBC # BLD AUTO: 8.3 K/UL (ref 4–11)
WBC #/AREA URNS AUTO: 2 /HPF (ref 0–5)

## 2024-05-04 PROCEDURE — 81001 URINALYSIS AUTO W/SCOPE: CPT

## 2024-05-04 PROCEDURE — 80053 COMPREHEN METABOLIC PANEL: CPT

## 2024-05-04 PROCEDURE — 6360000002 HC RX W HCPCS

## 2024-05-04 PROCEDURE — 74177 CT ABD & PELVIS W/CONTRAST: CPT

## 2024-05-04 PROCEDURE — 83605 ASSAY OF LACTIC ACID: CPT

## 2024-05-04 PROCEDURE — 96374 THER/PROPH/DIAG INJ IV PUSH: CPT

## 2024-05-04 PROCEDURE — 2580000003 HC RX 258

## 2024-05-04 PROCEDURE — 85025 COMPLETE CBC W/AUTO DIFF WBC: CPT

## 2024-05-04 PROCEDURE — 6370000000 HC RX 637 (ALT 250 FOR IP)

## 2024-05-04 PROCEDURE — 6360000004 HC RX CONTRAST MEDICATION

## 2024-05-04 PROCEDURE — 99285 EMERGENCY DEPT VISIT HI MDM: CPT

## 2024-05-04 PROCEDURE — 82270 OCCULT BLOOD FECES: CPT

## 2024-05-04 RX ORDER — NAPROXEN 500 MG/1
500 TABLET ORAL 2 TIMES DAILY WITH MEALS
Qty: 60 TABLET | Refills: 0 | Status: SHIPPED | OUTPATIENT
Start: 2024-05-04

## 2024-05-04 RX ORDER — HYDROCODONE BITARTRATE AND ACETAMINOPHEN 5; 325 MG/1; MG/1
1 TABLET ORAL EVERY 4 HOURS PRN
Qty: 18 TABLET | Refills: 0 | Status: SHIPPED | OUTPATIENT
Start: 2024-05-04 | End: 2024-05-07

## 2024-05-04 RX ORDER — TAMSULOSIN HYDROCHLORIDE 0.4 MG/1
0.4 CAPSULE ORAL DAILY
Qty: 30 CAPSULE | Refills: 0 | Status: SHIPPED | OUTPATIENT
Start: 2024-05-04

## 2024-05-04 RX ORDER — 0.9 % SODIUM CHLORIDE 0.9 %
1000 INTRAVENOUS SOLUTION INTRAVENOUS ONCE
Status: COMPLETED | OUTPATIENT
Start: 2024-05-04 | End: 2024-05-04

## 2024-05-04 RX ORDER — ONDANSETRON 4 MG/1
4 TABLET, ORALLY DISINTEGRATING ORAL 3 TIMES DAILY PRN
Qty: 21 TABLET | Refills: 0 | Status: SHIPPED | OUTPATIENT
Start: 2024-05-04

## 2024-05-04 RX ORDER — OXYCODONE HYDROCHLORIDE AND ACETAMINOPHEN 5; 325 MG/1; MG/1
1 TABLET ORAL ONCE
Status: COMPLETED | OUTPATIENT
Start: 2024-05-04 | End: 2024-05-04

## 2024-05-04 RX ORDER — KETOROLAC TROMETHAMINE 15 MG/ML
15 INJECTION, SOLUTION INTRAMUSCULAR; INTRAVENOUS ONCE
Status: COMPLETED | OUTPATIENT
Start: 2024-05-04 | End: 2024-05-04

## 2024-05-04 RX ADMIN — IOPAMIDOL 75 ML: 755 INJECTION, SOLUTION INTRAVENOUS at 12:30

## 2024-05-04 RX ADMIN — KETOROLAC TROMETHAMINE 15 MG: 15 INJECTION, SOLUTION INTRAMUSCULAR; INTRAVENOUS at 10:56

## 2024-05-04 RX ADMIN — SODIUM CHLORIDE 1000 ML: 9 INJECTION, SOLUTION INTRAVENOUS at 10:55

## 2024-05-04 RX ADMIN — OXYCODONE HYDROCHLORIDE AND ACETAMINOPHEN 1 TABLET: 5; 325 TABLET ORAL at 12:20

## 2024-05-04 ASSESSMENT — LIFESTYLE VARIABLES
HOW MANY STANDARD DRINKS CONTAINING ALCOHOL DO YOU HAVE ON A TYPICAL DAY: PATIENT DOES NOT DRINK
HOW OFTEN DO YOU HAVE A DRINK CONTAINING ALCOHOL: NEVER

## 2024-05-04 ASSESSMENT — ENCOUNTER SYMPTOMS
TROUBLE SWALLOWING: 0
RHINORRHEA: 0
VOMITING: 0
SHORTNESS OF BREATH: 0
SINUS PRESSURE: 0
WHEEZING: 0
NAUSEA: 0
COUGH: 0
CHEST TIGHTNESS: 0
SORE THROAT: 0
SINUS PAIN: 0
ABDOMINAL PAIN: 0

## 2024-05-04 ASSESSMENT — PAIN SCALES - GENERAL
PAINLEVEL_OUTOF10: 6
PAINLEVEL_OUTOF10: 7
PAINLEVEL_OUTOF10: 7

## 2024-05-04 ASSESSMENT — PAIN DESCRIPTION - ORIENTATION
ORIENTATION: LEFT;RIGHT
ORIENTATION: RIGHT;LEFT

## 2024-05-04 ASSESSMENT — PAIN DESCRIPTION - LOCATION
LOCATION: FLANK;BACK
LOCATION: BACK;FLANK

## 2024-05-04 ASSESSMENT — PAIN - FUNCTIONAL ASSESSMENT
PAIN_FUNCTIONAL_ASSESSMENT: ACTIVITIES ARE NOT PREVENTED
PAIN_FUNCTIONAL_ASSESSMENT: ACTIVITIES ARE NOT PREVENTED
PAIN_FUNCTIONAL_ASSESSMENT: 0-10

## 2024-05-04 ASSESSMENT — PAIN DESCRIPTION - PAIN TYPE
TYPE: ACUTE PAIN
TYPE: ACUTE PAIN

## 2024-05-04 ASSESSMENT — PAIN DESCRIPTION - DESCRIPTORS
DESCRIPTORS: ACHING;DISCOMFORT
DESCRIPTORS: DISCOMFORT

## 2024-05-04 ASSESSMENT — PAIN DESCRIPTION - FREQUENCY
FREQUENCY: CONTINUOUS
FREQUENCY: CONTINUOUS

## 2024-05-04 ASSESSMENT — PAIN DESCRIPTION - ONSET
ONSET: SUDDEN
ONSET: ON-GOING

## 2024-05-04 NOTE — ED TRIAGE NOTES
Patient arrived to the ED ambulatory from home w/ complaints of hematuria.     Patient/EMS reports states Reports on Thursday started noticing blood in her urine and Bilateral back/flank pain but when she was at her PCP on Wednesday she told them that she had burning w/ urination. They check urine and it was clean. Patient reports PCP put her on Amoxicillin d/t URI. Patient reports HX of current Kidney stone.     Patient A&O x 4, VSS w/ exception of elevated HR,

## 2024-05-04 NOTE — ED PROVIDER NOTES
[facility] EMERGENCY DEPARTMENT  EMERGENCY DEPARTMENT ENCOUNTER        Pt Name: Kelsi Amor  MRN: [id number]  Birthdate 1970  Date of evaluation: 5/4/2024  Provider: DAVE Betancourt - [place]  PCP: Sean Lynn MD  Note Started: 3:21 PM EDT 5/4/24      JEN. I have evaluated this patient.        CHIEF COMPLAINT       Chief Complaint   Patient presents with    Hematuria     Reports on Thursday started noticing blood in her urine and Bilateral back/flank pain but when she was at her PCP on Wednesday she told them that she had burning w/ urination. They check urine and it was clean. Patient reports PCP put her on Amoxicillin d/t URI. Patient reports HX of current Kidney stone.        HISTORY OF PRESENT ILLNESS: 1 or more Elements     History From: Patient    Chief Complaint: Gross hematuria, dysuria    Kelsi Amor is a 53 y.o. female who presents for evaluation of gross hematuria, dysuria, low back pain.  Symptoms for started last week.  Was seen by her primary care provider.  Symptoms at that time included burning with urination.  Had a urine analysis performed in her primary care provider's office.  Was told that there is no evidence of infection.  Was started on amoxicillin due to an upper respiratory infection.  Her symptoms have now progressed to low back pain and she has appreciated a large amount of blood with urination several times.  She approximates 4 out of the 5 times that she never urinated yesterday she was able to appreciate blood in the toilet bowl.  Does have a history of kidney stones and states that this feels different.  No change in bowel habits.  No fevers or chills, no nausea or vomiting, no flank pain.  She tells me that she is concerned because she had similar symptoms when she was admitted into the hospital with sepsis back in August 2023.    Nursing Notes were all reviewed and agreed with or any disagreements were addressed in the HPI.    REVIEW OF  MG per tablet 1 tablet (1 tablet Oral Given 5/4/24 1220)   iopamidol (ISOVUE-370) 76 % injection 75 mL (75 mLs IntraVENous Given 5/4/24 1230)             Is this patient to be included in the SEP-1 Core Measure due to severe sepsis or septic shock?   No   Exclusion criteria - the patient is NOT to be included for SEP-1 Core Measure due to:  2+ SIRS criteria are not met        Chronic Conditions affecting care:    has a past medical history of Anxiety, GERD (gastroesophageal reflux disease), and Kidney stones.    CONSULTS: (Who and What was discussed)  None    Records Reviewed (External and Source) EMR reviewed    CC/HPI Summary, DDx, ED Course, and Reassessment: Briefly, this is a 53-year-old female who presents with the above-mentioned concerns.  Examination as noted above.  No CVA tenderness which would be concerning for pyelonephritis.  Discussed initially starting with repeat urine analysis to rule out urinary tract infection.  If unremarkable expand diagnostic workup to include serology.  The patient is agreeable.    Urine analysis is negative for appearance or clarity.  There is a large amount of blood with a trace amount of leukocyte esterase present although nitrite negative, no bacteria, only 2 WBCs.  Peripheral IV access obtained and diagnostic workup expanded to include serology.  There is no leukocytosis, neutropenia, anemia, thrombocytopenia appreciated on CBC.  No renal impairment or electrolyte derangements on CMP.  LFTs are within normal limits.  Lactic acid negative.  I do not have clinical suspicion for systemic infection.  CT abdomen pelvis shows nonobstructing bilateral renal calculus.  Started on tamsulosin, Norco, naproxen, and Zofran referred to urology for follow-up and management.    The patient was very anxious and insistent that symptoms did not correlate with kidney stones.  I did offer both rectal exam and pelvic exam to ensure that there is no concern for vaginal bleeding or rectal

## 2024-05-04 NOTE — DISCHARGE INSTRUCTIONS
Your urine and no cystoscopy show evidence of blood in the urine but no clear infection.  There is a trace amount of leukocyte esterase without nitrites, WBCs, bacteria.  I have a low clinical suspicion for urinary tract infection.  Urine will be sent for culture.  This takes 2 days to result.  If positive you will get notification however if negative you will not.  I recommend they follow-up with your primary care provider to track this result however also be available to you in real-time on [employer].     All of your blood work was reassuring.  There is no evidence of blood loss anemia.  Your Hemoccult was negative which means of not concern for blood in the stool that cannot be visualized.  There was no abnormalities appreciated on your pelvic exam.  Your CT did show evidence of nonobstructing kidney stones.  This is likely the source of your pain.  Start tamsulosin, Zofran, Norco, naproxen for management of symptoms.  Increase your fluids.  Follow-up with urology with persistent or progressive symptoms, otherwise follow-up with your primary care provider.    Please return with uncontrolled pain, uncontrolled nausea or vomiting, fevers, chills, nausea, vomiting, decreased urinary output.

## 2025-06-07 NOTE — ED NOTES
Discharge and education instructions reviewed. Patient verbalized understanding, teach-back successful. Patient denied questions at this time. No acute distress noted. Patient instructed to follow-up as noted - return to emergency department if symptoms worsen. Patient verbalized understanding. Discharged per EDMD with discharge instructions.        Wesly Guallpa RN  08/27/23 6104 room air